# Patient Record
Sex: FEMALE | URBAN - METROPOLITAN AREA
[De-identification: names, ages, dates, MRNs, and addresses within clinical notes are randomized per-mention and may not be internally consistent; named-entity substitution may affect disease eponyms.]

---

## 2020-12-23 ENCOUNTER — TRANSFERRED RECORDS (OUTPATIENT)
Dept: HEALTH INFORMATION MANAGEMENT | Facility: CLINIC | Age: 25
End: 2020-12-23

## 2021-02-10 ENCOUNTER — TRANSFERRED RECORDS (OUTPATIENT)
Dept: HEALTH INFORMATION MANAGEMENT | Facility: CLINIC | Age: 26
End: 2021-02-10

## 2021-02-15 ENCOUNTER — MEDICAL CORRESPONDENCE (OUTPATIENT)
Dept: HEALTH INFORMATION MANAGEMENT | Facility: CLINIC | Age: 26
End: 2021-02-15

## 2021-04-13 ENCOUNTER — TELEPHONE (OUTPATIENT)
Dept: NEUROLOGY | Facility: CLINIC | Age: 26
End: 2021-04-13

## 2021-04-13 NOTE — TELEPHONE ENCOUNTER
Received referral from Virginia Gutiérrez NP and Joslyn Fu MD from [facility].    DX:  Frequent falls          Epilepsy          Tremor          Unsteady gait    Per hand written note on paperwork - Call Edwina @ [facility] [phone]

## 2021-05-24 ENCOUNTER — MEDICAL CORRESPONDENCE (OUTPATIENT)
Dept: HEALTH INFORMATION MANAGEMENT | Facility: CLINIC | Age: 26
End: 2021-05-24

## 2021-05-27 ENCOUNTER — OFFICE VISIT (OUTPATIENT)
Dept: NEUROLOGY | Facility: CLINIC | Age: 26
End: 2021-05-27
Payer: COMMERCIAL

## 2021-05-27 VITALS — WEIGHT: 137.2 LBS

## 2021-05-27 DIAGNOSIS — G40.909 RECURRENT SEIZURES (H): Primary | ICD-10-CM

## 2021-05-27 RX ORDER — ETHOSUXIMIDE 250 MG/5ML
550 SOLUTION ORAL 2 TIMES DAILY
COMMUNITY
End: 2022-06-09

## 2021-05-27 RX ORDER — DIVALPROEX SODIUM 125 MG/1
CAPSULE, COATED PELLETS ORAL
Qty: 180 CAPSULE | Refills: 11 | Status: SHIPPED | OUTPATIENT
Start: 2021-05-27 | End: 2022-06-09

## 2021-05-27 RX ORDER — DIVALPROEX SODIUM 125 MG/1
125 CAPSULE, COATED PELLETS ORAL 2 TIMES DAILY
COMMUNITY
Start: 2021-04-16 | End: 2021-05-27

## 2021-05-27 RX ORDER — POLYETHYLENE GLYCOL 3350 17 G/17G
1 POWDER, FOR SOLUTION ORAL DAILY
COMMUNITY

## 2021-05-27 RX ORDER — IBUPROFEN 100 MG/5ML
400 SUSPENSION, ORAL (FINAL DOSE FORM) ORAL PRN
COMMUNITY
Start: 2019-08-09

## 2021-05-27 SDOH — HEALTH STABILITY: MENTAL HEALTH: HOW OFTEN DO YOU HAVE A DRINK CONTAINING ALCOHOL?: NEVER

## 2021-05-27 NOTE — LETTER
2021       RE: Eli Linton  : 1995   MRN: [id number]      Dear Colleague,    Thank you for referring your patient, Eli Linton, to the [facility] EPILEPSY CARE at [facility]. Please see a copy of my visit note below.    Service Date: 2021    Joslyn Velázquez MD   [facility]   [id number], [address]   [place] [address]     RE:     Eli Linton  MRN:  [id number]  :  1995    Dear Dr. Trav Velázquez:    I had the pleasure of seeing your patient, Ms. Eli Linton, at [facility] today.  As you know, this patient is a 25-year-old female with history of epilepsy and Angelman syndrome.  She was referred because she had increased falls recently and had arm shaking or difficult to control.  The patient is accompanied by her  in the clinic today.    According to the medical record, she had history of seizures since she was about 1 years old.  She has been on ethosuximide and Depakote for many years, and her seizures were well controlled.  Her last known seizure was over 10 years ago.  It was reported that she had the focal impaired seizures and the generalized seizures when she was young.    It was reported that her last suspected seizure was in .  Her current seizure medications are:  Ethosuximide 550 mg b.i.d. and Depakote 375 mg in the morning and 500 mg in the evening.    For the past 6-8 months, she started to have increased falls recently and she also has bilateral arm shaking, sometimes can be fairly dramatic.  She can have arm shaking for like 8 hours a day.  She does not lose consciousness with these shaking, and sometimes she will fall.  It seems that she does not have bad injuries related to these falls.  When she has bad shaking, she may need assistance for walking.    Unfortunately, the patient cannot tolerate MRI or EEG so these were not done during the recent  years.    TRIGGERS FOR SEIZURES:  Unclear.    RISK FACTORS FOR SEIZURES:  She has a history of Angelman syndrome.  No history of head trauma with loss of consciousness.  No history of CNS infection.  She did have history of febrile convulsions when she was young.    Current Outpatient Medications   Medication Sig Dispense Refill     acetaminophen (TYLENOL) 32 mg/mL liquid Take 160 mg by mouth every 6 hours as needed for fever or mild pain       divalproex sodium delayed-release (DEPAKOTE SPRINKLE) 125 MG DR capsule Takes 375 in am and 375 mg at night 180 capsule 11     ethosuximide (ZARONTIN) 250 MG/5ML solution Take 550 mg by mouth 2 times daily       ibuprofen (ADVIL/MOTRIN) 100 MG/5ML suspension Take 400 mg by mouth as needed       magnesium hydroxide (MILK OF MAGNESIA) 400 MG/5ML suspension Take 5 mLs by mouth as needed       polyethylene glycol (MIRALAX) 17 g packet Take 1 packet by mouth daily         ALLERGIES:  Amoxicillin.    PAST MEDICAL HISTORY:  Epilepsy, tremor, unsteady gait, Angelman syndrome.    PAST SURGICAL HISTORY:  None.    FAMILY HISTORY:  No family history of seizures.    SOCIAL HISTORY:  She lives in a group home.  She had some Special Education.  She is not working.  Never , no children.  No smoking, no alcohol, no drug abuse.    REVIEW OF SYSTEMS:  Positive for constipation and urinary incontinence.    PHYSICAL EXAMINATION:      Weight 137 lb 3.2 oz (62.2 kg).    General exam: General Appearance: No acute distress. HEENT: Normocephalic, atraumatic. Neck: Supple.  Extremities: No edema, no clubbing, no cyanosis.     NEUROLOGIC:  Alert, nonverbal, partially following commands.    Cranial nerves grossly normal.  Partially cooperating with exam.    Motor exam:  Moving all extremities spontaneously.    Sensory exam:  Not able to assess.    Coordination:  No obvious ataxia observed.  Gait:  Normal gait.    PREVIOUS DIAGNOSTIC TESTING:  No relevant testing was performed  recently.    IMPRESSION:     1.  History of epilepsy, well controlled.  Last seizure was in .  2.  History of Angelman syndrome, stable.  3.  Recent spells of arm shaking and the related falls.  The etiology for these spells are not entirely clear at this time; however, it is fairly clear that these are not epilepsy or seizures.  Differential diagnosis would include movement disorder or simply behavioral.  I would suggest have consultation with a movement specialist and have psychiatry evaluation.  Another possibility for these arm shaking is asterixis related to increased ammonia; however, her recent ammonia were all relatively normal, so this may not be relevant in this case.    PLAN:    1.  The patient is not able to tolerate MRI or EEG; however, seizures are well controlled.  2.  Occupational and physical therapy might help.  3.  We will decrease Depakote to 375 mg twice daily.  4.  It may be helpful to refer to a movement specialist and a psychiatry evaluation if needed.  5.  Return to clinic as needed.        Sincerely,        MD Jairo Mcdermott MD      69 min total time was spent on the day of this visit.      45 min was spent on face to face time  8 min was spent on preparation of visit to review charts and labs, ordering medications and tests  16 min was spent on documentation of clinical information          D: 2021   T: 2021   MT: augusto    Name:     LYUDMILA DUTTA  MRN:      [id number]        Account:      [id number]   :      1995           Service Date: 2021       Document: [id number]      Again, thank you for allowing me to participate in the care of your patient.      Sincerely,    Jairo Lofton MD

## 2021-05-28 NOTE — PROGRESS NOTES
Service Date: 2021    Joslyn Velázquez MD   [facility]   [address], [address]   [place] [address]     RE:     Eli Linton  MRN:  [id number]  :  1995    Dear Dr. Trav Velázquez:    I had the pleasure of seeing your patient, Ms. Eli Linton, at [facility] today.  As you know, this patient is a 25-year-old female with history of epilepsy and Angelman syndrome.  She was referred because she had increased falls recently and had arm shaking or difficult to control.  The patient is accompanied by her  in the clinic today.    According to the medical record, she had history of seizures since she was about 1 years old.  She has been on ethosuximide and Depakote for many years, and her seizures were well controlled.  Her last known seizure was over 10 years ago.  It was reported that she had the focal impaired seizures and the generalized seizures when she was young.    It was reported that her last suspected seizure was in .  Her current seizure medications are:  Ethosuximide 550 mg b.i.d. and Depakote 375 mg in the morning and 500 mg in the evening.    For the past 6-8 months, she started to have increased falls recently and she also has bilateral arm shaking, sometimes can be fairly dramatic.  She can have arm shaking for like 8 hours a day.  She does not lose consciousness with these shaking, and sometimes she will fall.  It seems that she does not have bad injuries related to these falls.  When she has bad shaking, she may need assistance for walking.    Unfortunately, the patient cannot tolerate MRI or EEG so these were not done during the recent years.    TRIGGERS FOR SEIZURES:  Unclear.    RISK FACTORS FOR SEIZURES:  She has a history of Angelman syndrome.  No history of head trauma with loss of consciousness.  No history of CNS infection.  She did have history of febrile convulsions when she was young.    Current Outpatient  Medications   Medication Sig Dispense Refill     acetaminophen (TYLENOL) 32 mg/mL liquid Take 160 mg by mouth every 6 hours as needed for fever or mild pain       divalproex sodium delayed-release (DEPAKOTE SPRINKLE) 125 MG DR capsule Takes 375 in am and 375 mg at night 180 capsule 11     ethosuximide (ZARONTIN) 250 MG/5ML solution Take 550 mg by mouth 2 times daily       ibuprofen (ADVIL/MOTRIN) 100 MG/5ML suspension Take 400 mg by mouth as needed       magnesium hydroxide (MILK OF MAGNESIA) 400 MG/5ML suspension Take 5 mLs by mouth as needed       polyethylene glycol (MIRALAX) 17 g packet Take 1 packet by mouth daily         ALLERGIES:  Amoxicillin.    PAST MEDICAL HISTORY:  Epilepsy, tremor, unsteady gait, Angelman syndrome.    PAST SURGICAL HISTORY:  None.    FAMILY HISTORY:  No family history of seizures.    SOCIAL HISTORY:  She lives in a group home.  She had some Special Education.  She is not working.  Never , no children.  No smoking, no alcohol, no drug abuse.    REVIEW OF SYSTEMS:  Positive for constipation and urinary incontinence.    PHYSICAL EXAMINATION:      Weight 137 lb 3.2 oz (62.2 kg).    General exam: General Appearance: No acute distress. HEENT: Normocephalic, atraumatic. Neck: Supple.  Extremities: No edema, no clubbing, no cyanosis.     NEUROLOGIC:  Alert, nonverbal, partially following commands.    Cranial nerves grossly normal.  Partially cooperating with exam.    Motor exam:  Moving all extremities spontaneously.    Sensory exam:  Not able to assess.    Coordination:  No obvious ataxia observed.  Gait:  Normal gait.    PREVIOUS DIAGNOSTIC TESTING:  No relevant testing was performed recently.    IMPRESSION:     1.  History of epilepsy, well controlled.  Last seizure was in 2009.  2.  History of Angelman syndrome, stable.  3.  Recent spells of arm shaking and the related falls.  The etiology for these spells are not entirely clear at this time; however, it is fairly clear that these  are not epilepsy or seizures.  Differential diagnosis would include movement disorder or simply behavioral.  I would suggest have consultation with a movement specialist and have psychiatry evaluation.  Another possibility for these arm shaking is asterixis related to increased ammonia; however, her recent ammonia were all relatively normal, so this may not be relevant in this case.    PLAN:    1.  The patient is not able to tolerate MRI or EEG; however, seizures are well controlled.  2.  Occupational and physical therapy might help.  3.  We will decrease Depakote to 375 mg twice daily.  4.  It may be helpful to refer to a movement specialist and a psychiatry evaluation if needed.  5.  Return to clinic as needed.        Sincerely,        MD Jairo Mcdermott MD      69 min total time was spent on the day of this visit.      45 min was spent on face to face time  8 min was spent on preparation of visit to review charts and labs, ordering medications and tests  16 min was spent on documentation of clinical information          D: 2021   T: 2021   MT: augusto    Name:     LYUDMILA DUTTA  MRN:      [id number]        Account:      [id number]   :      1995           Service Date: 2021       Document: [id number]

## 2022-06-09 ENCOUNTER — VIRTUAL VISIT (OUTPATIENT)
Dept: NEUROLOGY | Facility: CLINIC | Age: 27
End: 2022-06-09
Payer: COMMERCIAL

## 2022-06-09 ENCOUNTER — TELEPHONE (OUTPATIENT)
Dept: NEUROLOGY | Facility: CLINIC | Age: 27
End: 2022-06-09

## 2022-06-09 VITALS — HEIGHT: 63 IN | BODY MASS INDEX: 24.45 KG/M2 | WEIGHT: 138 LBS

## 2022-06-09 DIAGNOSIS — G40.909 RECURRENT SEIZURES (H): ICD-10-CM

## 2022-06-09 RX ORDER — DIVALPROEX SODIUM 125 MG/1
CAPSULE, COATED PELLETS ORAL
Qty: 180 CAPSULE | Refills: 11 | Status: SHIPPED | OUTPATIENT
Start: 2022-06-09 | End: 2023-04-24

## 2022-06-09 RX ORDER — ETHOSUXIMIDE 250 MG/5ML
550 SOLUTION ORAL 2 TIMES DAILY
Qty: 700 ML | Refills: 11 | Status: SHIPPED | OUTPATIENT
Start: 2022-06-09 | End: 2023-05-04

## 2022-06-09 NOTE — PATIENT INSTRUCTIONS
Times of Days           Medication Tablet Size Number of Tablets/Capsules Total Daily Dosage    Depakote 125 3 3       750 mg    Zorontin 550 1 1       1100 mg                                                                                                                 Carry this with you at all times.  CONTINUE TAKING YOUR OTHER MEDICATIONS AS PREVIOUSLY DIRECTED.      * * *Do not store medications in the bathroom.  Keep medications away from children!* * *

## 2022-06-09 NOTE — LETTER
2022     RE: Eli Linton  : 1995   MRN: [id number]      Dear Colleague,    Thank you for referring your patient, Eli Linton, to the [facility] EPILEPSY CARE at [facility]. Please see a copy of my visit note below.    Eli is a 27 year old who is being evaluated via a billable video visit.      How would you like to obtain your AVS? Mail a copy  If the video visit is dropped, the invitation should be resent by: Other e-mail: [email or web address]  Will anyone else be joining your video visit? Yes,  Edwina will be joining.       Video Start Time: 11:30 AM  Video-Visit Details    Type of service:  Video Visit    Video End Time:11:50 AM    Originating Location (pt. Location): Home    Distant Location (provider location):  [facility] EPILEPSY CARE     Platform used for Video Visit: Nov[employer]       Medication and allergies have been reviewed.       ANABELLA Asencio        CC: Follow up for seizures.    HPI: Video call for follow up.  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  She was referred last year because she had increased falls recently and had arm shaking or difficult to control.  Her  Edwina was also in the video call today.    Since the last visit about one year ago, she continue to have arms shaking. about once every 2 days.  Each shaking will last for 2 min.  Sometimes shaking will affect her eating, she will not be able to hold a spoon. Her Depakote was decreased to 375 mg bid.    According to the medical record, she had history of seizures since she was about 1 years old.  She has been on ethosuximide and Depakote for many years, and her seizures were well controlled.  Her last known seizure was over 10 years ago.  It was reported that she had the focal impaired seizures and the generalized seizures when she was young.    It was reported that her last suspected seizure  was in 2009.  Her current seizure medications are:  Ethosuximide 550 mg b.i.d. and Depakote 375 mg in the morning and 500 mg in the evening.    For the past 6-8 months, she started to have increased falls recently and she also has bilateral arm shaking, sometimes can be fairly dramatic.  She can have arm shaking for like 8 hours a day.  She does not lose consciousness with these shaking, and sometimes she will fall.  It seems that she does not have bad injuries related to these falls.  When she has bad shaking, she may need assistance for walking.    Unfortunately, the patient cannot tolerate MRI or EEG so these were not done during the recent years.    TRIGGERS FOR SEIZURES:  Unclear.    RISK FACTORS FOR SEIZURES:  She has a history of Angelman syndrome.  No history of head trauma with loss of consciousness.  No history of CNS infection.  She did have history of febrile convulsions when she was young.    Current Outpatient Medications   Medication Sig Dispense Refill     acetaminophen (TYLENOL) 32 mg/mL liquid Take 160 mg by mouth every 6 hours as needed for fever or mild pain       divalproex sodium delayed-release (DEPAKOTE SPRINKLE) 125 MG DR capsule Takes 375 in am and 375 mg at night 180 capsule 11     ethosuximide (ZARONTIN) 250 MG/5ML solution Take 550 mg by mouth 2 times daily       ibuprofen (ADVIL/MOTRIN) 100 MG/5ML suspension Take 400 mg by mouth as needed       magnesium hydroxide (MILK OF MAGNESIA) 400 MG/5ML suspension Take 5 mLs by mouth as needed       polyethylene glycol (MIRALAX) 17 g packet Take 1 packet by mouth daily         ALLERGIES:  Amoxicillin.    PAST MEDICAL HISTORY:  Epilepsy, tremor, unsteady gait, Angelman syndrome.    PAST SURGICAL HISTORY:  None.    FAMILY HISTORY:  No family history of seizures.    SOCIAL HISTORY:  She lives in a group home.  She had some Special Education.  She is not working.  Never , no children.  No smoking, no alcohol, no drug abuse.    REVIEW OF  SYSTEMS:  Positive for constipation and urinary incontinence.    PHYSICAL EXAMINATION:      Alert, non-verbal.  Not following commands.    PREVIOUS DIAGNOSTIC TESTING:  No relevant testing was performed recently.    IMPRESSION:     1.  History of epilepsy, well controlled.  Last seizure was in 2009.  2.  History of Angelman syndrome, stable.  3.  Recent spells of arm shaking and the related falls.  The etiology for these spells are not entirely clear at this time; however, it is fairly clear that these are not epilepsy or seizures.  Differential diagnosis would include movement disorder or simply behavioral.  I would suggest have consultation with a movement specialist and have psychiatry evaluation.  Another possibility for these arm shaking is asterixis related to increased ammonia; however, her recent ammonia were all relatively normal.    Since the last visit about one year ago, she continue to have arms shaking. about once every 2 days.  Each shaking will last for 2 min.  Sometimes shaking will affect her eating, she will not be able to hold a spoon. Her Depakote was decreased to 375 mg bid.    PLAN:    1.  The patient is not able to tolerate MRI or EEG; however, seizures are well controlled.  2.  Continue Depakote to 375 mg twice daily.  3.  It may be helpful to refer to a movement specialist and a psychiatry evaluation if needed.  4.  Return to clinic as needed.  Group home staff was advised to take some videos of her shaking to help the diagnosis.      32 min total time was spent on the day of this visit.      20 min was spent on face to face time  6 min was spent on preparation of visit to review charts and labs, ordering medications and tests  6 min was spent on documentation of clinical information      Again, thank you for allowing me to participate in the care of your patient.      Sincerely,    Jairo Lofton MD

## 2022-06-09 NOTE — TELEPHONE ENCOUNTER
Received the medical referral form to be completed.  Form saved to the MovieSet drive.  Encounter routed.  Hailey Barron CMA

## 2022-06-09 NOTE — NURSING NOTE
Edwina states pt is also taking Cranberry Capsules 500MG once daily in the morning, capsules have to be opened.

## 2022-06-09 NOTE — PROGRESS NOTES
CC: Follow up for seizures.    HPI: Video call for follow up.  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  She was referred last year because she had increased falls recently and had arm shaking or difficult to control.  Her  Edwina was also in the video call today.    Since the last visit about one year ago, she continue to have arms shaking. about once every 2 days.  Each shaking will last for 2 min.  Sometimes shaking will affect her eating, she will not be able to hold a spoon. Her Depakote was decreased to 375 mg bid.    According to the medical record, she had history of seizures since she was about 1 years old.  She has been on ethosuximide and Depakote for many years, and her seizures were well controlled.  Her last known seizure was over 10 years ago.  It was reported that she had the focal impaired seizures and the generalized seizures when she was young.    It was reported that her last suspected seizure was in 2009.  Her current seizure medications are:  Ethosuximide 550 mg b.i.d. and Depakote 375 mg in the morning and 500 mg in the evening.    For the past 6-8 months, she started to have increased falls recently and she also has bilateral arm shaking, sometimes can be fairly dramatic.  She can have arm shaking for like 8 hours a day.  She does not lose consciousness with these shaking, and sometimes she will fall.  It seems that she does not have bad injuries related to these falls.  When she has bad shaking, she may need assistance for walking.    Unfortunately, the patient cannot tolerate MRI or EEG so these were not done during the recent years.    TRIGGERS FOR SEIZURES:  Unclear.    RISK FACTORS FOR SEIZURES:  She has a history of Angelman syndrome.  No history of head trauma with loss of consciousness.  No history of CNS infection.  She did have history of febrile convulsions when she was young.    Current Outpatient Medications   Medication  Sig Dispense Refill     acetaminophen (TYLENOL) 32 mg/mL liquid Take 160 mg by mouth every 6 hours as needed for fever or mild pain       divalproex sodium delayed-release (DEPAKOTE SPRINKLE) 125 MG DR capsule Takes 375 in am and 375 mg at night 180 capsule 11     ethosuximide (ZARONTIN) 250 MG/5ML solution Take 550 mg by mouth 2 times daily       ibuprofen (ADVIL/MOTRIN) 100 MG/5ML suspension Take 400 mg by mouth as needed       magnesium hydroxide (MILK OF MAGNESIA) 400 MG/5ML suspension Take 5 mLs by mouth as needed       polyethylene glycol (MIRALAX) 17 g packet Take 1 packet by mouth daily         ALLERGIES:  Amoxicillin.    PAST MEDICAL HISTORY:  Epilepsy, tremor, unsteady gait, Angelman syndrome.    PAST SURGICAL HISTORY:  None.    FAMILY HISTORY:  No family history of seizures.    SOCIAL HISTORY:  She lives in a group home.  She had some Special Education.  She is not working.  Never , no children.  No smoking, no alcohol, no drug abuse.    REVIEW OF SYSTEMS:  Positive for constipation and urinary incontinence.    PHYSICAL EXAMINATION:      Alert, non-verbal.  Not following commands.    PREVIOUS DIAGNOSTIC TESTING:  No relevant testing was performed recently.    IMPRESSION:     1.  History of epilepsy, well controlled.  Last seizure was in 2009.  2.  History of Angelman syndrome, stable.  3.  Recent spells of arm shaking and the related falls.  The etiology for these spells are not entirely clear at this time; however, it is fairly clear that these are not epilepsy or seizures.  Differential diagnosis would include movement disorder or simply behavioral.  I would suggest have consultation with a movement specialist and have psychiatry evaluation.  Another possibility for these arm shaking is asterixis related to increased ammonia; however, her recent ammonia were all relatively normal.    Since the last visit about one year ago, she continue to have arms shaking. about once every 2 days.  Each shaking  will last for 2 min.  Sometimes shaking will affect her eating, she will not be able to hold a spoon. Her Depakote was decreased to 375 mg bid.    PLAN:    1.  The patient is not able to tolerate MRI or EEG; however, seizures are well controlled.  2.  Continue Depakote to 375 mg twice daily.  3.  It may be helpful to refer to a movement specialist and a psychiatry evaluation if needed.  4.  Return to clinic as needed.  Group home staff was advised to take some videos of her shaking to help the diagnosis.      32 min total time was spent on the day of this visit.      20 min was spent on face to face time  6 min was spent on preparation of visit to review charts and labs, ordering medications and tests  6 min was spent on documentation of clinical information

## 2022-06-09 NOTE — PROGRESS NOTES
Eli is a 27 year old who is being evaluated via a billable video visit.      How would you like to obtain your AVS? Mail a copy  If the video visit is dropped, the invitation should be resent by: Other e-mail: [email or web address]  Will anyone else be joining your video visit? Yes,  Edwina will be joining.       Video Start Time: 11:30 AM  Video-Visit Details    Type of service:  Video Visit    Video End Time:11:50 AM    Originating Location (pt. Location): Home    Distant Location (provider location):  [facility] EPILEPSY CARE     Platform used for Video Visit: [employer]       Medication and allergies have been reviewed.       Jude Simpson, VF

## 2022-06-20 ENCOUNTER — MEDICAL CORRESPONDENCE (OUTPATIENT)
Dept: HEALTH INFORMATION MANAGEMENT | Facility: CLINIC | Age: 27
End: 2022-06-20

## 2022-06-22 ENCOUNTER — DOCUMENTATION ONLY (OUTPATIENT)
Dept: OTHER | Facility: CLINIC | Age: 27
End: 2022-06-22

## 2022-12-20 ENCOUNTER — TELEPHONE (OUTPATIENT)
Dept: NEUROLOGY | Facility: CLINIC | Age: 27
End: 2022-12-20

## 2022-12-20 NOTE — TELEPHONE ENCOUNTER
What is the concern that needs to be addressed by a nurse? Patient missed dose of Depakote 12/18 & 12/19, she has been very sleepy since.    May a detailed message be left on voicemail? Yes    Date of last office visit: 6/9/22    Message routed to: [facility] RN Pool

## 2022-12-21 NOTE — TELEPHONE ENCOUNTER
Call placed to Edwina.  Patient missed a dose on the 18th, but received her doses as prescribed on the 19th.  Facility staff have been able to identify what happened and have taken steps to reduce the risk of it happening again (facility staff will have two staff check administrations).    No seizures reported by the weekend staff, patient is currently at her baseline.    Edwina wanted  to know that they have not yet been able to arrange a psychiatry provider, but that it is currently being worked on.    No other questions or concerns at this time

## 2022-12-30 ENCOUNTER — TELEPHONE (OUTPATIENT)
Dept: NEUROLOGY | Facility: CLINIC | Age: 27
End: 2022-12-30

## 2022-12-31 NOTE — TELEPHONE ENCOUNTER
Spoke to group home staff.  Found a capsule that patient likely didn't take today (depakote).  There may be issues with her successfully getting the medication.    Staff has concerns about this.    If concerns persist may want to get a depakote level which we discussed.  They will try to remedy the situation and continue to monitor the situation.

## 2023-04-24 ENCOUNTER — TELEPHONE (OUTPATIENT)
Dept: NEUROLOGY | Facility: CLINIC | Age: 28
End: 2023-04-24

## 2023-04-24 DIAGNOSIS — G40.909 RECURRENT SEIZURES (H): ICD-10-CM

## 2023-04-24 RX ORDER — DIVALPROEX SODIUM 125 MG/1
CAPSULE, COATED PELLETS ORAL
Qty: 180 CAPSULE | Refills: 2 | Status: SHIPPED | OUTPATIENT
Start: 2023-04-24 | End: 2023-05-04

## 2023-04-24 NOTE — TELEPHONE ENCOUNTER
Returned call to Edwina, No answer so lvm.  Patient last seen 6/2022 and was to continue divalproex 375 BID with f/u PRN. Instructed they need to f/u yearly if they would like Dr. Lofton to continue provided refills for divalproex.    Divalproex refilled and encouraged to schedule f/u with Dr. Lofton in next few months.       Abigail Martin PA-C

## 2023-04-24 NOTE — TELEPHONE ENCOUNTER
What is the concern that needs to be addressed by a nurse? Edwina Patient PCA  Worker call in stating patient is out of depakote medication. Edwina is also asking if  would like to see patient.     May a detailed message be left on voicemail? Yes         Message routed to: [facility] RN Pool

## 2023-05-04 ENCOUNTER — VIRTUAL VISIT (OUTPATIENT)
Dept: NEUROLOGY | Facility: CLINIC | Age: 28
End: 2023-05-04
Payer: COMMERCIAL

## 2023-05-04 DIAGNOSIS — G40.909 RECURRENT SEIZURES (H): ICD-10-CM

## 2023-05-04 RX ORDER — ETHOSUXIMIDE 250 MG/5ML
550 SOLUTION ORAL 2 TIMES DAILY
Qty: 700 ML | Refills: 11 | Status: SHIPPED | OUTPATIENT
Start: 2023-05-04 | End: 2024-05-30

## 2023-05-04 RX ORDER — DIVALPROEX SODIUM 125 MG/1
CAPSULE, COATED PELLETS ORAL
Qty: 180 CAPSULE | Refills: 11 | Status: SHIPPED | OUTPATIENT
Start: 2023-05-04 | End: 2024-05-16

## 2023-05-04 NOTE — NURSING NOTE
Unable to do PHQ-9 with patient      Is the patient currently in the state of [place]? YES    Visit mode:VIDEO    If the visit is dropped, the patient can be reconnected by: VIDEO VISIT: Send to e-mail at: [email or web address]  Will anyone else be joining the visit? YES: How would they like to receive their invitation? Text to cell phone: Edwina- Group Home Staff      How would you like to obtain your AVS? MyChart    Are changes needed to the allergy or medication list? NO    Reason for visit: Video Visit (Edwina said, medication refills, wondering if PCP needs to monitor the medication like before. )      Mel Doss, VF

## 2023-05-04 NOTE — LETTER
2023       RE: Eli Linton  : 1995   MRN: [id number]        Dear Colleague,    Thank you for referring your patient, Eli Linton, to the [facility] EPILEPSY CARE at [facility]. Please see a copy of my visit note below.    CC: Follow up for seizures.    HPI: Video call for follow up.  This patient is a 27-year-old female with history of epilepsy and Angelman syndrome.  She was referred last year because she had increased falls recently and had arm shaking or difficult to control.  Her  Edwina was also in the video call today.    Since the last visit about one year ago, she had no seizures. She continue to have arms shaking, about 3 times a week.  Each shaking will last for 1-2 min.  Sometimes shaking will affect her eating, she will not be able to hold a spoon. Her Depakote was decreased to 375 mg bid.  Her last seizure was over 10 years ago. She is taking Depakote 375 mg bid and Zarontin 550 mg bid.    According to the medical record, she had history of seizures since she was about 1 years old.  She has been on ethosuximide and Depakote for many years, and her seizures were well controlled.  Her last known seizure was over 10 years ago.  It was reported that she had the focal impaired seizures and the generalized seizures when she was young.    It was reported that her last suspected seizure was in .  Her current seizure medications are:  Ethosuximide 550 mg b.i.d. and Depakote 375 mg in the morning and 500 mg in the evening.    For the past 6-8 months, she started to have increased falls recently and she also has bilateral arm shaking, sometimes can be fairly dramatic.  She can have arm shaking for like 8 hours a day.  She does not lose consciousness with these shaking, and sometimes she will fall.  It seems that she does not have bad injuries related to these falls.  When she has bad shaking, she may need assistance for  walking.    Unfortunately, the patient cannot tolerate MRI or EEG so these were not done during the recent years.    TRIGGERS FOR SEIZURES:  Unclear.    RISK FACTORS FOR SEIZURES:  She has a history of Angelman syndrome.  No history of head trauma with loss of consciousness.  No history of CNS infection.  She did have history of febrile convulsions when she was young.    Current Outpatient Medications   Medication Sig Dispense Refill    acetaminophen (TYLENOL) 32 mg/mL liquid Take 160 mg by mouth every 6 hours as needed for fever or mild pain      divalproex sodium delayed-release (DEPAKOTE SPRINKLE) 125 MG DR capsule Take 3 tabs (375 mg) in am and 3 tabs (375 mg) at night 180 capsule 2    ethosuximide (ZARONTIN) 250 MG/5ML solution Take 11 mLs (550 mg) by mouth 2 times daily 700 mL 11    ibuprofen (ADVIL/MOTRIN) 100 MG/5ML suspension Take 400 mg by mouth as needed      magnesium hydroxide (MILK OF MAGNESIA) 400 MG/5ML suspension Take 5 mLs by mouth as needed      polyethylene glycol (MIRALAX) 17 g packet Take 1 packet by mouth daily         ALLERGIES:  Amoxicillin.    PAST MEDICAL HISTORY:  Epilepsy, tremor, unsteady gait, Angelman syndrome.    PAST SURGICAL HISTORY:  None.    FAMILY HISTORY:  No family history of seizures.    SOCIAL HISTORY:  She lives in a group home.  She had some Special Education.  She is not working.  Never , no children.  No smoking, no alcohol, no drug abuse.    REVIEW OF SYSTEMS:  Positive for constipation and urinary incontinence.    PHYSICAL EXAMINATION:      Alert, non-verbal.  Not following commands.    PREVIOUS DIAGNOSTIC TESTING:  No relevant testing was performed recently.    IMPRESSION:     1.  History of epilepsy, well controlled.  Last seizure was in 2009.  2.  History of Angelman syndrome, stable.  3.  Recent spells of arm shaking and the related falls.  The etiology for these spells are not entirely clear at this time; however, it is fairly clear that these are not  epilepsy or seizures.  Differential diagnosis would include movement disorder or simply behavioral.  I would suggest have consultation with a movement specialist and have psychiatry evaluation.  Another possibility for these arm shaking is asterixis related to increased ammonia; however, her recent ammonia were all relatively normal.    Since the last visit about one year ago, she had no seizures. She continue to have arms shaking, about 3 times a week.  Each shaking will last for 1-2 min.  Sometimes shaking will affect her eating, she will not be able to hold a spoon. Her Depakote was decreased to 375 mg bid.  Her last seizure was over 10 years ago.    She is taking Depakote 375 mg bid and Zarontin 550 mg bid.    PLAN:    1.  The patient is not able to tolerate MRI or EEG; seizures are well controlled.  2.  Continue Depakote to 375 mg twice daily, Zarontin 550 mg bid.  3.  Return to clinic in 12 month to follow up with Raven. Group home staff was advised to take some videos of her shaking to help the diagnosis.      28 min total time was spent on the day of this visit.      18 min was spent on face to face time  10 min was spent on preparation of visit to review charts and labs, ordering medications and tests, and documentation of clinical information          Again, thank you for allowing me to participate in the care of your patient.      Sincerely,    Jairo Lofton MD

## 2023-05-04 NOTE — PROGRESS NOTES
Virtual Visit Details      Originating Location (pt. Location): Home    Distant Location (provider location):  On-site  Platform used for Video Visit: Jimmy

## 2023-05-04 NOTE — PROGRESS NOTES
CC: Follow up for seizures.    HPI: Video call for follow up.  This patient is a 27-year-old female with history of epilepsy and Angelman syndrome.  She was referred last year because she had increased falls recently and had arm shaking or difficult to control.  Her  Edwina was also in the video call today.    Since the last visit about one year ago, she had no seizures. She continue to have arms shaking, about 3 times a week.  Each shaking will last for 1-2 min.  Sometimes shaking will affect her eating, she will not be able to hold a spoon. Her Depakote was decreased to 375 mg bid.  Her last seizure was over 10 years ago. She is taking Depakote 375 mg bid and Zarontin 550 mg bid.    According to the medical record, she had history of seizures since she was about 1 years old.  She has been on ethosuximide and Depakote for many years, and her seizures were well controlled.  Her last known seizure was over 10 years ago.  It was reported that she had the focal impaired seizures and the generalized seizures when she was young.    It was reported that her last suspected seizure was in 2009.  Her current seizure medications are:  Ethosuximide 550 mg b.i.d. and Depakote 375 mg in the morning and 500 mg in the evening.    For the past 6-8 months, she started to have increased falls recently and she also has bilateral arm shaking, sometimes can be fairly dramatic.  She can have arm shaking for like 8 hours a day.  She does not lose consciousness with these shaking, and sometimes she will fall.  It seems that she does not have bad injuries related to these falls.  When she has bad shaking, she may need assistance for walking.    Unfortunately, the patient cannot tolerate MRI or EEG so these were not done during the recent years.    TRIGGERS FOR SEIZURES:  Unclear.    RISK FACTORS FOR SEIZURES:  She has a history of Angelman syndrome.  No history of head trauma with loss of consciousness.  No history of CNS  infection.  She did have history of febrile convulsions when she was young.    Current Outpatient Medications   Medication Sig Dispense Refill     acetaminophen (TYLENOL) 32 mg/mL liquid Take 160 mg by mouth every 6 hours as needed for fever or mild pain       divalproex sodium delayed-release (DEPAKOTE SPRINKLE) 125 MG DR capsule Take 3 tabs (375 mg) in am and 3 tabs (375 mg) at night 180 capsule 2     ethosuximide (ZARONTIN) 250 MG/5ML solution Take 11 mLs (550 mg) by mouth 2 times daily 700 mL 11     ibuprofen (ADVIL/MOTRIN) 100 MG/5ML suspension Take 400 mg by mouth as needed       magnesium hydroxide (MILK OF MAGNESIA) 400 MG/5ML suspension Take 5 mLs by mouth as needed       polyethylene glycol (MIRALAX) 17 g packet Take 1 packet by mouth daily         ALLERGIES:  Amoxicillin.    PAST MEDICAL HISTORY:  Epilepsy, tremor, unsteady gait, Angelman syndrome.    PAST SURGICAL HISTORY:  None.    FAMILY HISTORY:  No family history of seizures.    SOCIAL HISTORY:  She lives in a group home.  She had some Special Education.  She is not working.  Never , no children.  No smoking, no alcohol, no drug abuse.    REVIEW OF SYSTEMS:  Positive for constipation and urinary incontinence.    PHYSICAL EXAMINATION:      Alert, non-verbal.  Not following commands.    PREVIOUS DIAGNOSTIC TESTING:  No relevant testing was performed recently.    IMPRESSION:     1.  History of epilepsy, well controlled.  Last seizure was in 2009.  2.  History of Angelman syndrome, stable.  3.  Recent spells of arm shaking and the related falls.  The etiology for these spells are not entirely clear at this time; however, it is fairly clear that these are not epilepsy or seizures.  Differential diagnosis would include movement disorder or simply behavioral.  I would suggest have consultation with a movement specialist and have psychiatry evaluation.  Another possibility for these arm shaking is asterixis related to increased ammonia; however, her  recent ammonia were all relatively normal.    Since the last visit about one year ago, she had no seizures. She continue to have arms shaking, about 3 times a week.  Each shaking will last for 1-2 min.  Sometimes shaking will affect her eating, she will not be able to hold a spoon. Her Depakote was decreased to 375 mg bid.  Her last seizure was over 10 years ago.    She is taking Depakote 375 mg bid and Zarontin 550 mg bid.    PLAN:    1.  The patient is not able to tolerate MRI or EEG; seizures are well controlled.  2.  Continue Depakote to 375 mg twice daily, Zarontin 550 mg bid.  3.  Return to clinic in 12 month to follow up with Raven. Group home staff was advised to take some videos of her shaking to help the diagnosis.      28 min total time was spent on the day of this visit.      18 min was spent on face to face time  10 min was spent on preparation of visit to review charts and labs, ordering medications and tests, and documentation of clinical information

## 2024-02-22 ENCOUNTER — OFFICE VISIT (OUTPATIENT)
Dept: NEUROLOGY | Facility: CLINIC | Age: 29
End: 2024-02-22
Payer: COMMERCIAL

## 2024-02-22 VITALS — TEMPERATURE: 97.2 F

## 2024-02-22 DIAGNOSIS — G40.909 RECURRENT SEIZURES (H): Primary | ICD-10-CM

## 2024-02-22 NOTE — LETTER
2024       RE: Eli Linton  : 1995   MRN: [id number]      Dear Colleague,    Thank you for referring your patient, Eli Linton, to the [facility] EPILEPSY CARE at [facility]. Please see a copy of my visit note below.    [facility] Epilepsy Care Progress Note      Patient:  Eli Linton  :  1995   Age:  28 year old   Today's Office Visit:  2024    Epilepsy Data:  According to the medical record, she had history of seizures since she was about 1 years old.  She has been on ethosuximide and Depakote for many years, and her seizures were well controlled.  Her last known seizure was over 10 years ago.  It was reported that she had the focal impaired seizures and the generalized seizures when she was young.     It was reported that her last suspected seizure was in .  Her current seizure medications are:  Ethosuximide 550 mg b.i.d. and Depakote 375 mg in the morning and 500 mg in the evening.     For the past 6-8 months, she started to have increased falls recently and she also has bilateral arm shaking, sometimes can be fairly dramatic.  She can have arm shaking for like 8 hours a day.  She does not lose consciousness with these shaking, and sometimes she will fall.  It seems that she does not have bad injuries related to these falls.  When she has bad shaking, she may need assistance for walking.     Unfortunately, the patient cannot tolerate MRI or EEG so these were not done during the recent years.        History of Present Illness:   Ms. Linton was last seen by Dr. Rolly mejía on 2023. At the time of the patient's last visit, she had history of epilepsy, well controlled, with her last seizure in . She had a history of Angelman syndrome which was stable. She had recent spells of arm shaking and related falls, etiology not clear, though not felt to be seizures. Movement consultation versus psychiatry  was suggested. Her depakote was decreased with this, ammonia was normal with asterixis a possibility but less likely. She could not tolerate an MRI or EEG, and was well controlled. She was to continue Depakote 375mg twice daily and Zarontin 550mg twice daily. She was to follow up in 1 year.     Today, Ms. Linton presents with two group home staff. They note she is having increasing falls. They are concerned it may be related to her seizure medications. She can slip and fall. The tremor, drooling and falls are daily.     With her shaking, she sometimes needs assistance with feeding.     She is having more problems with behavioral issues recently. There is question for possible UTI.     She has not had any seizure since 2009.      She was having issues with drooling in the past when her depakote was high.     She has had increased unsteadiness, with delays in initiation of movement. She is having more trouble with her grab bars with her shaking. They are using a gait belt with her unsteadiness.    She is sleeping well, getting greater than 5 hours. They state she is unpredictable. She is aware of shift changes which impacts her sleep.    Current Outpatient Medications   Medication Sig Dispense Refill    acetaminophen (TYLENOL) 32 mg/mL liquid Take 160 mg by mouth every 6 hours as needed for fever or mild pain      divalproex sodium delayed-release (DEPAKOTE SPRINKLE) 125 MG DR capsule Take 3 tabs (375 mg) in am and 3 tabs (375 mg) at night 180 capsule 11    ethosuximide (ZARONTIN) 250 MG/5ML solution Take 11 mLs (550 mg) by mouth 2 times daily 700 mL 11    ibuprofen (ADVIL/MOTRIN) 100 MG/5ML suspension Take 400 mg by mouth as needed      magnesium hydroxide (MILK OF MAGNESIA) 400 MG/5ML suspension Take 5 mLs by mouth as needed      polyethylene glycol (MIRALAX) 17 g packet Take 1 packet by mouth daily          Perceived AED Side Effects:  Uncertain    Medication Notes:        AED Medication Compliance:  compliant  most of the time    Diagnostic studies reviewed:  10/18/2022:  SODIUM  135 - 145 mmol/L 141     VALPROIC ACID,TOTAL  50.0 - 100.0 ug/mL 42.8 Low  [facility] LABORATORY   DATE OF LAST DOSE         3/10/2021      VALPROIC ACID,FREE        6.0 - 20.0 ug/mL <6.0 Low    DATE OF LAST DOSE            Comment: 3/10/2021     AMMONIA  11 - 35 umol/L 39 High      ETHOSUXIMIDE              40.0 - 100.0 ug/ml 92.9   Resulting Agency [facility]     Specimen Collected: 12/23/20      ALT (SGPT)  8 - 45 IU/L 9   AST (SGOT)  2 - 40 IU/L 14   Resulting [facility] LABORATORY     Specimen Collected: 10/08/20     Review of Systems:  No urinary frequency, endorses strong smelling urine.  Lethargy / Tiredness:  No  Nausea / Vomiting:  No  Sleepiness:  stable  Poor Balance:  Yes - frequently, increased  Behavioral Changes:  requiring more redirection. She has been eating bowel movements  Have you experienced a traumatic fall since your last visit: YES - sprained ankles  Are these falls related to your seizures:  NO      Exam:    Temp 97.2  F (36.2  C) (Temporal)      Wt Readings from Last 5 Encounters:   06/09/22 138 lb (62.6 kg)   05/27/21 137 lb 3.2 oz (62.2 kg)         Wide based gait, walks on toes with shuffling. Able to rise from a seated position on her own. Able to turn off the lights and open the door on her own. Strength is intact in upper extremities. Dysconjugate gaze. Mouth open for most of visit with a smile. Nonverbal. Some shaking of upper extremities, appeared behavioral    Assessment and Plan:   Ms. Linton has a history of well controlled epilepsy, in the setting of Angelman syndrome. Her last known seizure was in 2009. She has been having increasing falls, tremors, and drooling, which they are concerned may be medication related. She has also had recent behavioral issues, with possible urinary tract infection. She has not tolerated MRI or EEG studies.     At this time, I have  recommended updated lab work for medication monitoring, with her increase in falls and drooling. I have not made any medication changes at this time, though will reach out if any concerns on the lab work.    I have recommended they discuss the possibility of a home safety evaluation with primary care regarding their concerns with her grab bars, and also to evaluate for a urinary tract infection with her strong smelling urine and recent behavioral changes. She requires catheterization for a urinalysis.    At their request, guidance was provided on how to manage missed doses of her seizure medications, and when to call 911 for her seizures.    Ms. Linton will follow up with myself or Dr. Lofton in three months. If they have questions, concerns, or worsening symptoms, they were encouraged to contact the clinic.    Thank you for letting me participate in your patient's care.    As described above, I met with the patient for 30 minutes and during this time counseling was greater than 50% of the visit time.          Again, thank you for allowing me to participate in the care of your patient.      Sincerely,    Raven Urbano PA-C

## 2024-02-22 NOTE — PATIENT INSTRUCTIONS
We will check labs for monitoring with the increased falls and drooling.     Continue current medications for now    Discuss the possibility of a home safety evaluation with primary care regarding the grab bars, etc, and possible urinary tract infection with the strong smelling urine and behavioral changes    Follow up in 3 months with myself or Dr. Lofton. Call with questions, concerns, or worsening symptoms.    Missed Doses:  IF YOU REALIZE WITHIN 24 HOURS:  ...You MISSED ONE DOSE of your anticonvulsant medication(s), take the missed dose immediately unless it is time for your next scheduled dose. If that is the case, take your next scheduled dose, wait two hours, and then take the missed dose.  ...You MISSED TWO OR MORE DOSES, take one of the missed doses immediately, even if it is time for your next scheduled dose. Then call the [facility] [place] clinic to schedule an appointment.   IF YOU REALIZE NOW YOU MISSED A DOSE MORE THAN 24 HOURS AGO, nallely it on your calendar. DO NOT take an extra dose.  An extra dose of an antiepileptic drug is not serious. Ordinarily, at most, you may experience increased side effects for several hours.     When to call 911 for Seizures:  Call 911 if:  The person does not start breathing within 1 minute after the seizure. If this happens call 911 immediately and start CPR.  The person sustains an injury  The person has one seizure right after another without regaining consciousness in between  A GTC seizure lasts over 5 minutes  The person requests an ambulance

## 2024-02-22 NOTE — PROGRESS NOTES
[facility] Epilepsy Care Progress Note      Patient:  Eli Linton  :  1995   Age:  28 year old   Today's Office Visit:  2024    Epilepsy Data:  According to the medical record, she had history of seizures since she was about 1 years old.  She has been on ethosuximide and Depakote for many years, and her seizures were well controlled.  Her last known seizure was over 10 years ago.  It was reported that she had the focal impaired seizures and the generalized seizures when she was young.     It was reported that her last suspected seizure was in .  Her current seizure medications are:  Ethosuximide 550 mg b.i.d. and Depakote 375 mg in the morning and 500 mg in the evening.     For the past 6-8 months, she started to have increased falls recently and she also has bilateral arm shaking, sometimes can be fairly dramatic.  She can have arm shaking for like 8 hours a day.  She does not lose consciousness with these shaking, and sometimes she will fall.  It seems that she does not have bad injuries related to these falls.  When she has bad shaking, she may need assistance for walking.     Unfortunately, the patient cannot tolerate MRI or EEG so these were not done during the recent years.        History of Present Illness:   Ms. Linton was last seen by Dr. Rolly mejía on 2023. At the time of the patient's last visit, she had history of epilepsy, well controlled, with her last seizure in . She had a history of Angelman syndrome which was stable. She had recent spells of arm shaking and related falls, etiology not clear, though not felt to be seizures. Movement consultation versus psychiatry was suggested. Her depakote was decreased with this, ammonia was normal with asterixis a possibility but less likely. She could not tolerate an MRI or EEG, and was well controlled. She was to continue Depakote 375mg twice daily and Zarontin 550mg twice daily. She was to follow up in 1 year.      Today, Ms. Linton presents with two group home staff. They note she is having increasing falls. They are concerned it may be related to her seizure medications. She can slip and fall. The tremor, drooling and falls are daily.     With her shaking, she sometimes needs assistance with feeding.     She is having more problems with behavioral issues recently. There is question for possible UTI.     She has not had any seizure since 2009.      She was having issues with drooling in the past when her depakote was high.     She has had increased unsteadiness, with delays in initiation of movement. She is having more trouble with her grab bars with her shaking. They are using a gait belt with her unsteadiness.    She is sleeping well, getting greater than 5 hours. They state she is unpredictable. She is aware of shift changes which impacts her sleep.    Current Outpatient Medications   Medication Sig Dispense Refill    acetaminophen (TYLENOL) 32 mg/mL liquid Take 160 mg by mouth every 6 hours as needed for fever or mild pain      divalproex sodium delayed-release (DEPAKOTE SPRINKLE) 125 MG DR capsule Take 3 tabs (375 mg) in am and 3 tabs (375 mg) at night 180 capsule 11    ethosuximide (ZARONTIN) 250 MG/5ML solution Take 11 mLs (550 mg) by mouth 2 times daily 700 mL 11    ibuprofen (ADVIL/MOTRIN) 100 MG/5ML suspension Take 400 mg by mouth as needed      magnesium hydroxide (MILK OF MAGNESIA) 400 MG/5ML suspension Take 5 mLs by mouth as needed      polyethylene glycol (MIRALAX) 17 g packet Take 1 packet by mouth daily          Perceived AED Side Effects:  Uncertain    Medication Notes:        AED Medication Compliance:  compliant most of the time    Diagnostic studies reviewed:  10/18/2022:  SODIUM  135 - 145 mmol/L 141     VALPROIC ACID,TOTAL  50.0 - 100.0 ug/mL 42.8 Low  [facility]   DATE OF LAST DOSE         3/10/2021      VALPROIC ACID,FREE        6.0 - 20.0 ug/mL <6.0 Low    DATE OF  LAST DOSE            Comment: 3/10/2021     AMMONIA  11 - 35 umol/L 39 High      ETHOSUXIMIDE              40.0 - 100.0 ug/ml 92.9   Resulting Agency [facility]     Specimen Collected: 12/23/20      ALT (SGPT)  8 - 45 IU/L 9   AST (SGOT)  2 - 40 IU/L 14   Resulting Agency [facility]     Specimen Collected: 10/08/20     Review of Systems:  No urinary frequency, endorses strong smelling urine.  Lethargy / Tiredness:  No  Nausea / Vomiting:  No  Sleepiness:  stable  Poor Balance:  Yes - frequently, increased  Behavioral Changes:  requiring more redirection. She has been eating bowel movements  Have you experienced a traumatic fall since your last visit: YES - sprained ankles  Are these falls related to your seizures:  NO      Exam:    Temp 97.2  F (36.2  C) (Temporal)      Wt Readings from Last 5 Encounters:   06/09/22 138 lb (62.6 kg)   05/27/21 137 lb 3.2 oz (62.2 kg)         Wide based gait, walks on toes with shuffling. Able to rise from a seated position on her own. Able to turn off the lights and open the door on her own. Strength is intact in upper extremities. Dysconjugate gaze. Mouth open for most of visit with a smile. Nonverbal. Some shaking of upper extremities, appeared behavioral    Assessment and Plan:   Ms. Linton has a history of well controlled epilepsy, in the setting of Angelman syndrome. Her last known seizure was in 2009. She has been having increasing falls, tremors, and drooling, which they are concerned may be medication related. She has also had recent behavioral issues, with possible urinary tract infection. She has not tolerated MRI or EEG studies.     At this time, I have recommended updated lab work for medication monitoring, with her increase in falls and drooling. I have not made any medication changes at this time, though will reach out if any concerns on the lab work.    I have recommended they discuss the possibility of a home safety evaluation with primary  care regarding their concerns with her grab bars, and also to evaluate for a urinary tract infection with her strong smelling urine and recent behavioral changes. She requires catheterization for a urinalysis.    At their request, guidance was provided on how to manage missed doses of her seizure medications, and when to call 911 for her seizures.    Ms. Linton will follow up with myself or Dr. Lofton in three months. If they have questions, concerns, or worsening symptoms, they were encouraged to contact the clinic.    Thank you for letting me participate in your patient's care.    As described above, I met with the patient for 30 minutes and during this time counseling was greater than 50% of the visit time.

## 2024-05-07 DIAGNOSIS — G40.909 RECURRENT SEIZURES (H): ICD-10-CM

## 2024-05-16 RX ORDER — DIVALPROEX SODIUM 125 MG/1
CAPSULE, COATED PELLETS ORAL
Qty: 186 CAPSULE | Refills: 10 | Status: SHIPPED | OUTPATIENT
Start: 2024-05-16 | End: 2024-09-05

## 2024-05-16 NOTE — TELEPHONE ENCOUNTER
divalproex sodium delayed-release (DEPAKOTE SPRINKLE) 125 MG DR capsule 180 capsule 11 5/4/2023       Last Office Visit: 2/22/24  Future Office visit:   5/23/24    AST:20 ON 2/23/24  VALPROIC ACID: 51.6 ON 2/23/24  PLATELETS: 225 ON 2/23/24    Refill protocol passed    Rose Lane RN  P Red Flag Triage/MRT

## 2024-05-28 DIAGNOSIS — G40.909 RECURRENT SEIZURES (H): ICD-10-CM

## 2024-05-30 RX ORDER — ETHOSUXIMIDE 250 MG/5ML
SOLUTION ORAL
Qty: 700 ML | Refills: 9 | Status: SHIPPED | OUTPATIENT
Start: 2024-05-30

## 2024-05-30 NOTE — TELEPHONE ENCOUNTER
Incoming call from pharmacy. Patient is out of medication. Pharmacy needs new prescription for this medication.

## 2024-09-05 ENCOUNTER — OFFICE VISIT (OUTPATIENT)
Dept: NEUROLOGY | Facility: CLINIC | Age: 29
End: 2024-09-05
Payer: COMMERCIAL

## 2024-09-05 VITALS — WEIGHT: 167 LBS | BODY MASS INDEX: 26.84 KG/M2 | HEIGHT: 66 IN

## 2024-09-05 DIAGNOSIS — G40.909 RECURRENT SEIZURES (H): ICD-10-CM

## 2024-09-05 DIAGNOSIS — R25.1 EPISODE OF SHAKING: ICD-10-CM

## 2024-09-05 DIAGNOSIS — R29.6 RECURRENT FALLS: ICD-10-CM

## 2024-09-05 DIAGNOSIS — Q93.51 ANGELMAN SYNDROME: Primary | ICD-10-CM

## 2024-09-05 RX ORDER — QUETIAPINE FUMARATE 25 MG/1
TABLET, FILM COATED ORAL
COMMUNITY
Start: 2024-09-05

## 2024-09-05 RX ORDER — DIVALPROEX SODIUM 125 MG/1
CAPSULE, COATED PELLETS ORAL
Qty: 210 CAPSULE | Refills: 10 | Status: SHIPPED | OUTPATIENT
Start: 2024-09-05

## 2024-09-05 NOTE — LETTER
2024       RE: Eli Linton  : 1995   MRN: [id number]      Dear Colleague,    Thank you for referring your patient, Eli Litnon, to the [facility] EPILEPSY CARE at [facility]. Please see a copy of my visit note below.    [facility] Epilepsy Care Progress Note      Patient:  Eli Linton  :  1995   Age:  29 year old   Today's Office Visit:  2024  Chief Complaint: Follow up seizures, shaking spells, behavioral changes    Epilepsy Data:  According to the medical record, she had history of seizures since she was about 1 years old.  She has been on ethosuximide and Depakote for many years, and her seizures were well controlled.  Her last known seizure was over 10 years ago.  It was reported that she had the focal impaired seizures and the generalized seizures when she was young.     It was reported that her last suspected seizure was in .  Her current seizure medications are:  Ethosuximide 550 mg b.i.d. and Depakote 375 mg in the morning and 500 mg in the evening.     For the past 6-8 months, she started to have increased falls recently and she also has bilateral arm shaking, sometimes can be fairly dramatic.  She can have arm shaking for like 8 hours a day.  She does not lose consciousness with these shaking, and sometimes she will fall.  It seems that she does not have bad injuries related to these falls.  When she has bad shaking, she may need assistance for walking.     Unfortunately, the patient cannot tolerate MRI or EEG so these were not done during the recent years.      History of Present Illness:   Ms. Linton was last seen by myself on 2024. At the time of the patient's last visit, she had history of epilepsy, well controlled, with her last seizure in . She had Angelman syndrome with increasing falls, tremor and drooling.  She had prolonged history of arm shaking with related falls, of unclear  etiology, though felt to be fairly clear this was not representing seizures. She was having recent behavioral concerns with possible urinary tract infection. She could not tolerate MRI or EEG evaluations. Labs were ordered for further evaluation. She required catheterization for urinalysis, which we were not able to complete at our lab. They were to review having a urinalysis and possible home safety evaluation with primary care. She was to continue current dosing of her medications.    Today, Ms. Linton presents with two staff from [facility], new to the patient within the last six months. She has been shaking more and will fall backward. She has been caught when she falls back and has not hit the ground. They are not allowed to take videos of these events for further evaluation. They are initially adamant these are the same types of shaking and falls she was having previously, but are more intense. Upon further discussion regarding the shaking spells, it appears these events have possibly evolved or may be different than prior.     The shaking can last 30 seconds to 1 minute and 30 seconds. She flexes her arms with her hands in a fist and her whole body becomes tense. They state she will lose control of bladder during these events. They state she will be tired after the shaking spells, and can have some trouble getting up afterward. These can happen many times per hour. She does not lose consciousness.    They state she is having behavioral issues, and has seen psychiatry. She has been prescribed 25mg of Seroquel as needed, but has not needed to use this yet. She is eating her feces, menses, pulling hair, and biting. They state she is not having issues with using grab bars as discussed at her last visit.     She is sleeping through the night once she falls asleep. She may get up two the three times initially. She goes to bed between 9 and 10 PM and wakes at 6AM.     They deny any sign of infection. She does not  have any increased stress. There has been no  change in medications.      She has not had any known seizures since last seen. She is not having side effects with her medications that they are aware of.    She has not been evaluated by the movement team for her shaking spells as previously discussed.    Staff indicates she communicates her needs by leading them to what she wants, such as to the pantry when hungry.      Current Outpatient Medications   Medication Sig Dispense Refill     acetaminophen (TYLENOL) 32 mg/mL liquid Take 160 mg by mouth every 6 hours as needed for fever or mild pain       divalproex sodium delayed-release (DEPAKOTE SPRINKLE) 125 MG DR capsule TAKE 3 CAPSULES (375MG) BY MOUTH TWICE DAILY ( MORNING AND EVENING) 186 capsule 10     ethosuximide (ZARONTIN) 250 MG/5ML solution TAKE 11ML (550MG) BY MOUTH TWICE DAILY. **STOPPERS, LUIZ BOTTLES ONLY** *ORDER WHEN LOW* 700 mL 9     ibuprofen (ADVIL/MOTRIN) 100 MG/5ML suspension Take 400 mg by mouth as needed       magnesium hydroxide (MILK OF MAGNESIA) 400 MG/5ML suspension Take 5 mLs by mouth as needed       polyethylene glycol (MIRALAX) 17 g packet Take 1 packet by mouth daily          Perceived AED Side Effects:  No    Medication Notes:        AED Medication Compliance:  compliant most of the time  Using a pill box:  staff administer    2/23/2024  ETHOSUXIMIDE              40.0 - 100.0 ug/ml 56.5     AMMONIA  16 - 60 umol/L 42     VALPROIC ACID,TOTAL  50.0 - 100.0 ug/mL 51.6     VALPROIC ACID,FREE  6.0 - 20.0 ug/mL <2.8 Low      7/9/2024  WHITE BLOOD COUNT          4.5 - 11.0 thou/cu mm 8.5   RED BLOOD COUNT            4.00 - 5.20 mil/cu mm 4.19   HEMOGLOBIN                12.0 - 16.0 g/dL 12.9   HEMATOCRIT                33.0 - 51.0 % 38.3   MCV                        80 - 100 fL 91   MCH                        26.0 - 34.0 pg 30.8   MCHC                      32.0 - 36.0 g/dL 33.7   RDW                        11.5 - 15.5 % 11.6    PLATELET COUNT            140 - 440 thou/cu mm 217   MPV                        6.5 - 11.0 fL 11.1 High    NRBC                      % 0.0   ABS NRBC  thou /cu mm 0.0   % NEUT  % 45.5   % LYMPH  % 43.6   % MONO  % 7.6   % EOS  % 2.6   % BASO  % 0.6   % IMMATURE GRAN (METAS,MYELOS,PROS)  % 0.1   ABSOLUTE NEUTROPHILS      1.7 - 7.0 thou/cu mm 3.9   ABSOLUTE LYMPHOCYTES      0.9 - 2.9 thou/cu mm 3.7 High    ABSOLUTE MONOCYTES        <0.9 thou/cu mm 0.7   ABSOLUTE EOSINOPHILS      <0.5 thou/cu mm 0.2   ABSOLUTE BASOPHILS        <0.3 thou/cu mm 0.1   ABSOLUTE IMMATURE GRANULOCYTES(METAS,MYELOS,PROS)  <0.3 thou/cu mm 0.0     7/9/2024  SODIUM  136 - 145 mmol/L 140   POTASSIUM  3.5 - 5.1 mmol/L 4.5   CHLORIDE  98 - 107 mmol/L 105   CO2,TOTAL  22 - 29 mmol/L 25   ANION GAP  5 - 18 10   GLUCOSE  70 - 99 mg/dL 91   CALCIUM  8.6 - 10.0 mg/dL 9.5   BUN  6 - 20 mg/dL 21 High    CREATININE  0.50 - 0.90 mg/dL 0.62   BUN/CREAT RATIO  10 - 20 34 High    eGFR  >90 mL/min/1.73m2 >90   Comment: As of 03/15/2022, eGFR is calculated by the CKD-EPI creatinine equation without race adjustment.  eGFR can be influenced by muscle mass, exercise, and diet.  The reported eGFR is an estimation only and is only applicable if the renal function is stable.   ALBUMIN  4.0 - 4.9 g/dL 4.6   PROTEIN,TOTAL  6.0 - 8.0 g/dL 7.5   BILIRUBIN,TOTAL  0.0 - 1.2 mg/dL 0.2   ALK PHOSPHATASE  35 - 104 IU/L 89   ALT (SGPT)  10 - 35 IU/L 6 Low    AST (SGOT)  10 - 35 IU/L 19     Review of Systems:  Sleepiness:  Yes - going to bed earlier on her own  Behavioral Changes:  Yes  Have you experienced a traumatic fall since your last visit: NO      Other Issues:    Is patient safe to drive:  No      Exam:    There were no vitals taken for this visit.     Wt Readings from Last 5 Encounters:   06/09/22 138 lb (62.6 kg)   05/27/21 137 lb 3.2 oz (62.2 kg)     Wide based gait, walks on toes with shuffling, holding onto staff for longer distances. Able to rise from a seated  position on her own and ambulate unassisted. Does throw herself back at one point, with staff catching her, which they state is different from the shaking with falling backward. Dysconjugate gaze. Mouth open for most of visit with a smile. Nonverbal.     Assessment and Plan:   Ms. Linton has history of epilepsy in the setting of Angelman syndrome. Her last clear seizure was in 2009. She would not tolerate imaging or EEG studies. She is having increasing shaking spells with whole body stiffening and flexing of the arms, urinary incontinence, and falls, unclear if these are the same as the previously described shaking spells which were not felt to represent seizure. She is having increasing behavioral issues, working with psychiatry.     With the added urinary incontinence associated with the shaking spells, and increasing behavioral events, we can consider a slight increase in her Depakote to 375-500 to see if this provides any benefit. Her Depakote level is in the low therapeutic range, with normal ammonia level and liver function. I would still like to have her seen by the movement team, which had been recommended for similar if not the same events in the past.     I have advised them to continue to work with psychiatry for her behavioral changes.    Ms. Linton will follow up in 3 months with Dr. Lofton. If they have questions, concerns, or worsening symptoms in the meantime they were advised to contact the clinic.      I spent 47 minutes in total today to provide comprehensive medical care; Face to face time 30 minutes.  I spent 9 minutes writing the note and placing orders.   I spent 8 minutes reviewing the chart.     The rest of the time was spent with the patient in face to face interview. During this time key medical decisions were made with review of medical chart prior to visit, visit with patient, counseling/education, and post visit work, including documentation of note on the day of visit. I addressed all  questions the patient/caregiver raised in regards to epilepsy or related medical questions.   The longitudinal plan of care for the diagnosis(es)/condition(s) as documented were addressed during this visit. Due to the added complexity in care, I will continue to support Eli in the subsequent management and with ongoing continuity of care.                      Again, thank you for allowing me to participate in the care of your patient.      Sincerely,    Raven Urbano PA-C

## 2024-09-05 NOTE — PATIENT INSTRUCTIONS
We will increase depakote to 375mg in the morning and 500mg in the evening    Continue with psychiatry. The increase in depakote may help some with the behaviors as well.    We will have you see the movement team for the shaking as previously recommended.    Follow up in 3 months with Dr. Lofton. Call sooner with questions, concerns, or worsening symptoms.

## 2024-09-05 NOTE — PROGRESS NOTES
[facility] Epilepsy Care Progress Note      Patient:  Eli Linton  :  1995   Age:  29 year old   Today's Office Visit:  2024  Chief Complaint: Follow up seizures, shaking spells, behavioral changes    Epilepsy Data:  According to the medical record, she had history of seizures since she was about 1 years old.  She has been on ethosuximide and Depakote for many years, and her seizures were well controlled.  Her last known seizure was over 10 years ago.  It was reported that she had the focal impaired seizures and the generalized seizures when she was young.     It was reported that her last suspected seizure was in .  Her current seizure medications are:  Ethosuximide 550 mg b.i.d. and Depakote 375 mg in the morning and 500 mg in the evening.     For the past 6-8 months, she started to have increased falls recently and she also has bilateral arm shaking, sometimes can be fairly dramatic.  She can have arm shaking for like 8 hours a day.  She does not lose consciousness with these shaking, and sometimes she will fall.  It seems that she does not have bad injuries related to these falls.  When she has bad shaking, she may need assistance for walking.     Unfortunately, the patient cannot tolerate MRI or EEG so these were not done during the recent years.      History of Present Illness:   Ms. Linton was last seen by myself on 2024. At the time of the patient's last visit, she had history of epilepsy, well controlled, with her last seizure in . She had Angelman syndrome with increasing falls, tremor and drooling.  She had prolonged history of arm shaking with related falls, of unclear etiology, though felt to be fairly clear this was not representing seizures. She was having recent behavioral concerns with possible urinary tract infection. She could not tolerate MRI or EEG evaluations. Labs were ordered for further evaluation. She required catheterization for urinalysis, which  we were not able to complete at our lab. They were to review having a urinalysis and possible home safety evaluation with primary care. She was to continue current dosing of her medications.    Today, Ms. Linton presents with two staff from [facility], new to the patient within the last six months. She has been shaking more and will fall backward. She has been caught when she falls back and has not hit the ground. They are not allowed to take videos of these events for further evaluation. They are initially adamant these are the same types of shaking and falls she was having previously, but are more intense. Upon further discussion regarding the shaking spells, it appears these events have possibly evolved or may be different than prior.     The shaking can last 30 seconds to 1 minute and 30 seconds. She flexes her arms with her hands in a fist and her whole body becomes tense. They state she will lose control of bladder during these events. They state she will be tired after the shaking spells, and can have some trouble getting up afterward. These can happen many times per hour. She does not lose consciousness.    They state she is having behavioral issues, and has seen psychiatry. She has been prescribed 25mg of Seroquel as needed, but has not needed to use this yet. She is eating her feces, menses, pulling hair, and biting. They state she is not having issues with using grab bars as discussed at her last visit.     She is sleeping through the night once she falls asleep. She may get up two the three times initially. She goes to bed between 9 and 10 PM and wakes at 6AM.     They deny any sign of infection. She does not have any increased stress. There has been no  change in medications.      She has not had any known seizures since last seen. She is not having side effects with her medications that they are aware of.    She has not been evaluated by the movement team for her shaking spells as  previously discussed.    Staff indicates she communicates her needs by leading them to what she wants, such as to the pantry when hungry.      Current Outpatient Medications   Medication Sig Dispense Refill    acetaminophen (TYLENOL) 32 mg/mL liquid Take 160 mg by mouth every 6 hours as needed for fever or mild pain      divalproex sodium delayed-release (DEPAKOTE SPRINKLE) 125 MG DR capsule TAKE 3 CAPSULES (375MG) BY MOUTH TWICE DAILY ( MORNING AND EVENING) 186 capsule 10    ethosuximide (ZARONTIN) 250 MG/5ML solution TAKE 11ML (550MG) BY MOUTH TWICE DAILY. **STOPPERS, LUIZ BOTTLES ONLY** *ORDER WHEN LOW* 700 mL 9    ibuprofen (ADVIL/MOTRIN) 100 MG/5ML suspension Take 400 mg by mouth as needed      magnesium hydroxide (MILK OF MAGNESIA) 400 MG/5ML suspension Take 5 mLs by mouth as needed      polyethylene glycol (MIRALAX) 17 g packet Take 1 packet by mouth daily          Perceived AED Side Effects:  No    Medication Notes:        AED Medication Compliance:  compliant most of the time  Using a pill box:  staff administer    2/23/2024  ETHOSUXIMIDE              40.0 - 100.0 ug/ml 56.5     AMMONIA  16 - 60 umol/L 42     VALPROIC ACID,TOTAL  50.0 - 100.0 ug/mL 51.6     VALPROIC ACID,FREE  6.0 - 20.0 ug/mL <2.8 Low      7/9/2024  WHITE BLOOD COUNT          4.5 - 11.0 thou/cu mm 8.5   RED BLOOD COUNT            4.00 - 5.20 mil/cu mm 4.19   HEMOGLOBIN                12.0 - 16.0 g/dL 12.9   HEMATOCRIT                33.0 - 51.0 % 38.3   MCV                        80 - 100 fL 91   MCH                        26.0 - 34.0 pg 30.8   MCHC                      32.0 - 36.0 g/dL 33.7   RDW                        11.5 - 15.5 % 11.6   PLATELET COUNT            140 - 440 thou/cu mm 217   MPV                        6.5 - 11.0 fL 11.1 High    NRBC                      % 0.0   ABS NRBC  thou /cu mm 0.0   % NEUT  % 45.5   % LYMPH  % 43.6   % MONO  % 7.6   % EOS  % 2.6   % BASO  % 0.6   % IMMATURE GRAN (METAS,MYELOS,PROS)  % 0.1    ABSOLUTE NEUTROPHILS      1.7 - 7.0 thou/cu mm 3.9   ABSOLUTE LYMPHOCYTES      0.9 - 2.9 thou/cu mm 3.7 High    ABSOLUTE MONOCYTES        <0.9 thou/cu mm 0.7   ABSOLUTE EOSINOPHILS      <0.5 thou/cu mm 0.2   ABSOLUTE BASOPHILS        <0.3 thou/cu mm 0.1   ABSOLUTE IMMATURE GRANULOCYTES(METAS,MYELOS,PROS)  <0.3 thou/cu mm 0.0     7/9/2024  SODIUM  136 - 145 mmol/L 140   POTASSIUM  3.5 - 5.1 mmol/L 4.5   CHLORIDE  98 - 107 mmol/L 105   CO2,TOTAL  22 - 29 mmol/L 25   ANION GAP  5 - 18 10   GLUCOSE  70 - 99 mg/dL 91   CALCIUM  8.6 - 10.0 mg/dL 9.5   BUN  6 - 20 mg/dL 21 High    CREATININE  0.50 - 0.90 mg/dL 0.62   BUN/CREAT RATIO  10 - 20 34 High    eGFR  >90 mL/min/1.73m2 >90   Comment: As of 03/15/2022, eGFR is calculated by the CKD-EPI creatinine equation without race adjustment.  eGFR can be influenced by muscle mass, exercise, and diet.  The reported eGFR is an estimation only and is only applicable if the renal function is stable.   ALBUMIN  4.0 - 4.9 g/dL 4.6   PROTEIN,TOTAL  6.0 - 8.0 g/dL 7.5   BILIRUBIN,TOTAL  0.0 - 1.2 mg/dL 0.2   ALK PHOSPHATASE  35 - 104 IU/L 89   ALT (SGPT)  10 - 35 IU/L 6 Low    AST (SGOT)  10 - 35 IU/L 19     Review of Systems:  Sleepiness:  Yes - going to bed earlier on her own  Behavioral Changes:  Yes  Have you experienced a traumatic fall since your last visit: NO      Other Issues:    Is patient safe to drive:  No      Exam:    There were no vitals taken for this visit.     Wt Readings from Last 5 Encounters:   06/09/22 138 lb (62.6 kg)   05/27/21 137 lb 3.2 oz (62.2 kg)     Wide based gait, walks on toes with shuffling, holding onto staff for longer distances. Able to rise from a seated position on her own and ambulate unassisted. Does throw herself back at one point, with staff catching her, which they state is different from the shaking with falling backward. Dysconjugate gaze. Mouth open for most of visit with a smile. Nonverbal.     Assessment and Plan:   Ms. 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 Her last clear seizure was in 2009. She would not tolerate imaging or EEG studies. She is having increasing shaking spells with whole body stiffening and flexing of the arms, urinary incontinence, and falls, unclear if these are the same as the previously described shaking spells which were not felt to represent seizure. She is having increasing behavioral issues, working with psychiatry.     With the added urinary incontinence associated with the shaking spells, and increasing behavioral events, we can consider a slight increase in her Depakote to 375-500 to see if this provides any benefit. Her Depakote level is in the low therapeutic range, with normal ammonia level and liver function. I would still like to have her seen by the movement team, which had been recommended for similar if not the same events in the past.     I have advised them to continue to work with psychiatry for her behavioral changes.    Ms. Linton will follow up in 3 months with Dr. Lofton. If they have questions, concerns, or worsening symptoms in the meantime they were advised to contact the clinic.      I spent 47 minutes in total today to provide comprehensive medical care; Face to face time 30 minutes.  I spent 9 minutes writing the note and placing orders.   I spent 8 minutes reviewing the chart.     The rest of the time was spent with the patient in face to face interview. During this time key medical decisions were made with review of medical chart prior to visit, visit with patient, counseling/education, and post visit work, including documentation of note on the day of visit. I addressed all questions the patient/caregiver raised in regards to epilepsy or related medical questions.   The longitudinal plan of care for the diagnosis(es)/condition(s) as documented were addressed during this visit. Due to the added complexity in care, I will continue to support Eli in the subsequent  management and with ongoing continuity of care.

## 2024-09-18 DIAGNOSIS — R25.1 TREMOR: Primary | ICD-10-CM

## 2024-09-18 PROBLEM — G40.909 EPILEPSY (H): Status: ACTIVE | Noted: 2020-11-13

## 2024-09-18 PROBLEM — K05.6 PERIODONTAL DISEASE: Status: ACTIVE | Noted: 2024-08-15

## 2024-09-18 PROBLEM — G40.909 SEIZURE DISORDER (H): Status: ACTIVE | Noted: 2023-10-24

## 2024-09-18 RX ORDER — BISACODYL 10 MG
10 SUPPOSITORY, RECTAL RECTAL DAILY PRN
COMMUNITY

## 2024-09-18 RX ORDER — GUAIFENESIN AND DEXTROMETHORPHAN HYDROBROMIDE 100; 10 MG/5ML; MG/5ML
10 SOLUTION ORAL
COMMUNITY

## 2024-09-18 RX ORDER — POLYETHYLENE GLYCOL 3350 17 G/17G
POWDER, FOR SOLUTION ORAL
COMMUNITY
End: 2024-09-18

## 2024-09-18 RX ORDER — PSYLLIUM HUSK 3 G/5.8 G
POWDER (GRAM) ORAL
COMMUNITY
Start: 2024-08-01

## 2024-09-18 RX ORDER — FLUCONAZOLE 150 MG/1
TABLET ORAL
COMMUNITY

## 2024-09-18 RX ORDER — LORATADINE 10 MG/1
10 TABLET ORAL DAILY PRN
COMMUNITY

## 2024-09-18 RX ORDER — ACETAMINOPHEN AND CODEINE PHOSPHATE 120; 12 MG/5ML; MG/5ML
1 SOLUTION ORAL
COMMUNITY
Start: 2024-07-02 | End: 2024-09-18

## 2024-09-18 RX ORDER — LOPERAMIDE HCL 2 MG
2 CAPSULE ORAL
COMMUNITY

## 2024-09-18 RX ORDER — NYSTATIN 100000 U/G
OINTMENT TOPICAL
COMMUNITY
Start: 2024-06-20

## 2024-09-18 RX ORDER — ACETAMINOPHEN AND CODEINE PHOSPHATE 120; 12 MG/5ML; MG/5ML
0.35 SOLUTION ORAL DAILY
COMMUNITY
Start: 2024-07-02

## 2024-09-18 RX ORDER — CRANBERRY FRUIT EXTRACT 250 MG
CAPSULE ORAL
COMMUNITY
Start: 2023-04-25

## 2024-09-18 RX ORDER — PYRIDOXINE HCL (VITAMIN B6) 100 MG
TABLET ORAL
COMMUNITY

## 2024-09-18 RX ORDER — GINSENG 100 MG
CAPSULE ORAL
COMMUNITY
Start: 2024-08-13

## 2024-09-18 NOTE — PROGRESS NOTES
[address]  [facility] [address]  Mobile Phone  [phone]  Email  [email or web address]    Tremor/Angelman Syndrome - Recs in epic - Scheduled per pt - Referred by BRUCE MICHEL ([place] GUARDIAN) Fahning  Legal Guardian  Emergency Contact, Father    Hailey ([place] GUARDIAN) Fahning  Legal Guardian  Emergency Contact, Mother      Medications            Acetaminophen tylenol liquid        Bacitracin ointment         Bisacodyl dulcolax 10mg supp        Cranberry 250mg         Cranberry 500mg        Dextromethorphan guaifenesin         Divalproex delayed release depakote sprink 125mg        Ethosuximide zarontin 250mg/5ml soln        Fluconazole diflucan 150mg        Ibuprofen advil 100mg/5ml susp        Loperamide imodium 2mg        Loratadine claritin 10mg  prn       Mag hydroxide milk of magnesia         Nonformulary compressive stockings         NOrethindrone micronor 0.25mg        Nystatin mycostatin ointment         POlyethylene glycol miralax         Psyllium powder        Quetiapine seroquel 25mg prn                                                     Patient:  Eli ANDRADE Royer  :  1995   Age:  29 year old   Today's Office Visit:  2024  Chief Complaint: Follow up seizures, shaking spells, behavioral changes     Epilepsy Data:  According to the medical record, she had history of seizures since she was about 1 years old.  She has been on ethosuximide and Depakote for many years, and her seizures were well controlled.  Her last known seizure was over 10 years ago.  It was reported that she had the focal impaired seizures and the generalized seizures when she was young.     It was reported that her last suspected seizure was in .  Her current seizure medications are:  Ethosuximide 550 mg b.i.d. and Depakote 375 mg in the morning and 500 mg in the evening.     For the past 6-8 months, she started to have increased falls recently and she also has bilateral arm shaking, sometimes can  be fairly dramatic.  She can have arm shaking for like 8 hours a day.  She does not lose consciousness with these shaking, and sometimes she will fall.  It seems that she does not have bad injuries related to these falls.  When she has bad shaking, she may need assistance for walking.     Unfortunately, the patient cannot tolerate MRI or EEG so these were not done during the recent years.        History of Present Illness:   Ms. Linton was last seen by myself on 2/22/2024. At the time of the patient's last visit, she had history of epilepsy, well controlled, with her last seizure in 2009. She had Angelman syndrome with increasing falls, tremor and drooling.  She had prolonged history of arm shaking with related falls, of unclear etiology, though felt to be fairly clear this was not representing seizures. She was having recent behavioral concerns with possible urinary tract infection. She could not tolerate MRI or EEG evaluations. Labs were ordered for further evaluation. She required catheterization for urinalysis, which we were not able to complete at our lab. They were to review having a urinalysis and possible home safety evaluation with primary care. She was to continue current dosing of her medications.     Today, Ms. Linton presents with two staff from [facility], new to the patient within the last six months. She has been shaking more and will fall backward. She has been caught when she falls back and has not hit the ground. They are not allowed to take videos of these events for further evaluation. They are initially adamant these are the same types of shaking and falls she was having previously, but are more intense. Upon further discussion regarding the shaking spells, it appears these events have possibly evolved or may be different than prior.      The shaking can last 30 seconds to 1 minute and 30 seconds. She flexes her arms with her hands in a fist and her whole body becomes tense. They state she  will lose control of bladder during these events. They state she will be tired after the shaking spells, and can have some trouble getting up afterward. These can happen many times per hour. She does not lose consciousness.     They state she is having behavioral issues, and has seen psychiatry. She has been prescribed 25mg of Seroquel as needed, but has not needed to use this yet. She is eating her feces, menses, pulling hair, and biting. They state she is not having issues with using grab bars as discussed at her last visit.      She is sleeping through the night once she falls asleep. She may get up two the three times initially. She goes to bed between 9 and 10 PM and wakes at 6AM.      They deny any sign of infection. She does not have any increased stress. There has been no  change in medications.       She has not had any known seizures since last seen. She is not having side effects with her medications that they are aware of.     She has not been evaluated by the movement team for her shaking spells as previously discussed.     Staff indicates she communicates her needs by leading them to what she wants, such as to the pantry when hungry.    2/23/2024  ETHOSUXIMIDE              40.0 - 100.0 ug/ml 56.5      AMMONIA  16 - 60 umol/L 42      VALPROIC ACID,TOTAL  50.0 - 100.0 ug/mL 51.6      VALPROIC ACID,FREE  6.0 - 20.0 ug/mL <2.8 Low        Wt Readings from Last 5 Encounters:   06/09/22 138 lb (62.6 kg)   05/27/21 137 lb 3.2 oz (62.2 kg)      Wide based gait, walks on toes with shuffling, holding onto staff for longer distances. Able to rise from a seated position on her own and ambulate unassisted. Does throw herself back at one point, with staff catching her, which they state is different from the shaking with falling backward. Dysconjugate gaze. Mouth open for most of visit with a smile. Nonverbal.      Assessment and Plan:   Ms. Linton has history of epilepsy in the setting of [facility]  syndrome. Her last clear seizure was in 2009. She would not tolerate imaging or EEG studies. She is having increasing shaking spells with whole body stiffening and flexing of the arms, urinary incontinence, and falls, unclear if these are the same as the previously described shaking spells which were not felt to represent seizure. She is having increasing behavioral issues, working with psychiatry.      With the added urinary incontinence associated with the shaking spells, and increasing behavioral events, we can consider a slight increase in her Depakote to 375-500 to see if this provides any benefit. Her Depakote level is in the low therapeutic range, with normal ammonia level and liver function. I would still like to have her seen by the movement team, which had been recommended for similar if not the same events in the past.      I have advised them to continue to work with psychiatry for her behavioral change

## 2024-10-16 NOTE — PROGRESS NOTES
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  Has had imaging done in the past as well as EEG       Review of diagnosis    Movement disorder    Avoidance of dopamine blockers   No antipsychotics but on depakote    Motor complication review   N/a    Review of Impulse control disorders   N/a    Review of surgical or medication options   none    Gait/Balance/Falls       Exercise/Therapy performed/offered       Cognitive/Driving       Mood       Hallucinations/delusions       Sleep       Bladder/Renal/Prostate/Gyn/Other       GI/Constipation/GERD     SBO  Constipation  Abdominal pain    ENDO/Lipid/DM/Bone density/Thyroid      Cardio/heart/Hyper or Hypotensive       Vision/Dry Eyes/Cataracts/Glaucoma/Macular       Heme/Anticoagulation/Antiplatelet/Anemia/Other    Leukocytosis      ENT/Resp    Peridonatal disease    Skin/Cancer/Seborrhea/other      Musculoskeletal/Pain/Headache      Other:    Left ovarian cyst             Medications                 Acetaminophen tylenol liquid             Bacitracin ointment              Bisacodyl dulcolax 10mg supp             Cranberry 250mg              Cranberry 500mg             Dextromethorphan guaifenesin              Divalproex delayed release depakote sprink  125mg             Ethosuximide zarontin 250mg/5ml soln             Fluconazole diflucan 150mg             Ibuprofen advil 100mg/5ml susp             Loperamide imodium 2mg             Loratadine claritin 10mg  prn           Mag hydroxide milk of magnesia              Nonformulary compressive stockings              NOrethindrone micronor 0.25mg             Nystatin mycostatin ointment              POlyethylene glycol miralax              Psyllium powder             Quetiapine seroquel 25mg prn                                                                                        Plan:              https://pubmed.ncbi.nlm.nih.gov/32838291/  https://pubmed.ncbi.nlm.nih.gov/02322781/    Coding statement:   Medical Decision Making:  #  Chronic progressive medical conditions addressed  - see above --   Review and/or interpretation of unique test or documentation from a provider outside of neurology    Independent historian provided additional details   I  Prescription drug management and review of potential side effects and/or monitoring for side effects  -- see above ---  Health impacted by social determinants of health      I have reviewed the note as documented above.  This accurately captures the substance of my conversation with the patient and total time spent preparing for visit, executing visit and completing visit on the day of the visit:   minutes.  The portion of this total time included face to face time     The longitudinal plan of care for Eli Linton was addressed during this visit. Due to the added complexity in care, I will continue to support Eli Linton in the subsequent management of this condition(s) and with the ongoing continuity of care of this condition(s).      Fahad Johnston MD     ______________________________________    Last visit date and details:     Patient:  Eli Linton  :  1995   Age:  29 year old   Today's Office Visit:  2024  Chief Complaint: Follow up seizures,  shaking spells, behavioral changes     Epilepsy Data:  According to the medical record, she had history of seizures since she was about 1 years old.  She has been on ethosuximide and Depakote for many years, and her seizures were well controlled.  Her last known seizure was over 10 years ago.  It was reported that she had the focal impaired seizures and the generalized seizures when she was young.     It was reported that her last suspected seizure was in 2009.  Her current seizure medications are:  Ethosuximide 550 mg b.i.d. and Depakote 375 mg in the morning and 500 mg in the evening.     For the past 6-8 months, she started to have increased falls recently and she also has bilateral arm shaking, sometimes can be fairly dramatic.  She can have arm shaking for like 8 hours a day.  She does not lose consciousness with these shaking, and sometimes she will fall.  It seems that she does not have bad injuries related to these falls.  When she has bad shaking, she may need assistance for walking.     Unfortunately, the patient cannot tolerate MRI or EEG so these were not done during the recent years.        History of Present Illness:   Ms. Linton was last seen by myself on 2/22/2024. At the time of the patient's last visit, she had history of epilepsy, well controlled, with her last seizure in 2009. She had Angelman syndrome with increasing falls, tremor and drooling.  She had prolonged history of arm shaking with related falls, of unclear etiology, though felt to be fairly clear this was not representing seizures. She was having recent behavioral concerns with possible urinary tract infection. She could not tolerate MRI or EEG evaluations. Labs were ordered for further evaluation. She required catheterization for urinalysis, which we were not able to complete at our lab. They were to review having a urinalysis and possible home safety evaluation with primary care. She was to continue current dosing of her  medications.     Today, Ms. Linton presents with two staff from [facility], new to the patient within the last six months. She has been shaking more and will fall backward. She has been caught when she falls back and has not hit the ground. They are not allowed to take videos of these events for further evaluation. They are initially adamant these are the same types of shaking and falls she was having previously, but are more intense. Upon further discussion regarding the shaking spells, it appears these events have possibly evolved or may be different than prior.      The shaking can last 30 seconds to 1 minute and 30 seconds. She flexes her arms with her hands in a fist and her whole body becomes tense. They state she will lose control of bladder during these events. They state she will be tired after the shaking spells, and can have some trouble getting up afterward. These can happen many times per hour. She does not lose consciousness.     They state she is having behavioral issues, and has seen psychiatry. She has been prescribed 25mg of Seroquel as needed, but has not needed to use this yet. She is eating her feces, menses, pulling hair, and biting. They state she is not having issues with using grab bars as discussed at her last visit.      She is sleeping through the night once she falls asleep. She may get up two the three times initially. She goes to bed between 9 and 10 PM and wakes at 6AM.      They deny any sign of infection. She does not have any increased stress. There has been no  change in medications.       She has not had any known seizures since last seen. She is not having side effects with her medications that they are aware of.     She has not been evaluated by the movement team for her shaking spells as previously discussed.     Staff indicates she communicates her needs by leading them to what she wants, such as to the pantry when hungry.     2/23/2024  ETHOSUXIMIDE               40.0 - 100.0 ug/ml 56.5      AMMONIA  16 - 60 umol/L 42      VALPROIC ACID,TOTAL  50.0 - 100.0 ug/mL 51.6      VALPROIC ACID,FREE  6.0 - 20.0 ug/mL <2.8 Low              Wt Readings from Last 5 Encounters:   06/09/22 138 lb (62.6 kg)   05/27/21 137 lb 3.2 oz (62.2 kg)      Wide based gait, walks on toes with shuffling, holding onto staff for longer distances. Able to rise from a seated position on her own and ambulate unassisted. Does throw herself back at one point, with staff catching her, which they state is different from the shaking with falling backward. Dysconjugate gaze. Mouth open for most of visit with a smile. Nonverbal.      Assessment and Plan:   Ms. Linton has history of epilepsy in the setting of Angelman syndrome. Her last clear seizure was in 2009. She would not tolerate imaging or EEG studies. She is having increasing shaking spells with whole body stiffening and flexing of the arms, urinary incontinence, and falls, unclear if these are the same as the previously described shaking spells which were not felt to represent seizure. She is having increasing behavioral issues, working with psychiatry.      With the added urinary incontinence associated with the shaking spells, and increasing behavioral events, we can consider a slight increase in her Depakote to 375-500 to see if this provides any benefit. Her Depakote level is in the low therapeutic range, with normal ammonia level and liver function. I would still like to have her seen by the movement team, which had been recommended for similar if not the same events in the past.      I have advised them to continue to work with psychiatry for her behavioral change          ______________________________________      Patient was asked about 14 Review of systems including changes in vision (dry eyes, double vision), hearing, heart, lungs, musculoskeletal, depression, anxiety, snoring, RBD, insomnia, urinary frequency, urinary urgency, constipation,  swallowing problems, hematological, ID, allergies, skin problems: seborrhea, endocrinological: thyroid, diabetes, cholesterol; balance, weight changes, and other neurological problems and these were not significant at this time except for   Patient Active Problem List   Diagnosis    Angelman syndrome    Constipation, chronic    Cyst of left ovary    Epilepsy (H)    Seizure disorder (H)    Generalized abdominal pain    Leukocytosis    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  No past medical history on file.  Social History     Socioeconomic History    Marital status: Single     Spouse name: Not on file    Number of children: Not on file    Years of education: Not on file    Highest education level: Not on file   Occupational History    Not on file   Tobacco Use    Smoking status: Never    Smokeless tobacco: Never   Substance and Sexual Activity    Alcohol use: Never    Drug use: Never    Sexual activity: Not on file   Other Topics Concern    Parent/sibling w/ CABG, MI or angioplasty before 65F 55M? Not Asked   Social History Narrative    Not on file     Social Determinants of Health     Financial Resource Strain: High Risk (1/1/2022)    Received from [facility], [facility]    Financial Resource Strain     Difficulty of Paying Living Expenses: Not on file     Difficulty of Paying Living Expenses: Not on file   Food Insecurity: Not on file   Transportation Needs: Not on file   Physical Activity: Not on file   Stress: Not on file   Social Connections: Unknown (1/1/2022)    [facility], [facility]    Social Connections     Frequency  of Communication with Friends and Family: Not on file   Interpersonal Safety: Not on file   Housing Stability: Not on file       Drug and lactation database from the [place] [employer] of Medicine:  http://toxnet.nlm.nih.gov/cgi-bin/sis/htmlgen?LACT      B/P: Data Unavailable, T: Data Unavailable, P: Data Unavailable, R: Data Unavailable 0 lbs 0 oz  There were no vitals taken for this visit., There is no height or weight on file to calculate BMI.  Medications and Vitals not listed above were documented in the cart and reviewed by me.     Current Outpatient Medications   Medication Sig Dispense Refill    acetaminophen (TYLENOL) 32 mg/mL liquid Take 160 mg by mouth every 6 hours as needed for fever or mild pain      bacitracin 500 UNIT/GM OINT SMARTSIG:Topical      bisacodyl (DULCOLAX) 10 MG suppository Place 10 mg rectally daily as needed.      Cranberry 250 MG CAPS TAKE 2 CAPSULES (500MG) BY MOUTH ONCE DAILY. BREAK APART AND PUT IN YOGURT AS PATIENT IS UNABLE TO SWALLOW PILLS   NON-COVERED MED      Cranberry 500 MG CAPS cranberry 500 mg capsule 1 break apart capsule daily to be put in yogurt as pt is unable to swallow pills      Dextromethorphan-guaiFENesin  MG/5ML syrup Take 10 mLs by mouth.      divalproex sodium delayed-release (DEPAKOTE SPRINKLE) 125 MG DR capsule TAKE 3 CAPSULES (375MG) BY MOUTH in the morning and 4 capsules (500mg) in the evening 210 capsule 10    ethosuximide (ZARONTIN) 250 MG/5ML solution TAKE 11ML (550MG) BY MOUTH TWICE DAILY. **STOPPERS, LUIZ BOTTLES ONLY** *ORDER WHEN LOW* 700 mL 9    fluconazole (DIFLUCAN) 150 MG tablet       ibuprofen (ADVIL/MOTRIN) 100 MG/5ML suspension Take 400 mg by mouth as needed      loperamide (IMODIUM) 2 MG capsule Take 2 mg by mouth every 3 hours as needed.      loratadine (CLARITIN) 10 MG tablet Take 10 mg by mouth daily as needed.      magnesium hydroxide (MILK OF MAGNESIA) 400 MG/5ML suspension Take 5 mLs by mouth as needed       NONFORMULARY For personal use. Length: calf Strength: 16-20 mmHg Circumference in cm. Fax to [phone]      norethindrone (MICRONOR) 0.35 MG tablet Take 0.35 mg by mouth daily.      nystatin (MYCOSTATIN) 397233 UNIT/GM external ointment SMARTSIG:Topical      polyethylene glycol (MIRALAX) 17 g packet Take 1 packet by mouth daily      Psyllium (REGULOID) 57.6 % POWD SMARTSIG:By Mouth      PSYLLIUM PO Take 1 tsp by mouth.      QUEtiapine (SEROQUEL) 25 MG tablet Prn through psychiatry           Fahad Johnston MD

## 2024-11-06 NOTE — TELEPHONE ENCOUNTER
RECORDS RECEIVED FROM: [facility] Everywhere   REASON FOR VISIT: Tremor/Angelman Syndrome   PROVIDER: Fahad Johnston MD   DATE OF APPT: 11/15/24 @ 2:45 pm    NOTES (FOR ALL VISITS) STATUS DETAILS   OFFICE NOTE from referring provider Internal 9/5/24, 2/22/24 Raven Urbano PA-C @[facility]     OFFICE NOTE from other specialist Care Everywhere 9/10/24, 7/2/24 Virginia Gutiérrez NP  @[facility]    1/30/24 Smiley Daniel NP  @[facility]    5/4/23 Jairo Lofton MD @[facility]     MEDICATION LIST Internal    IMAGING  (FOR ALL VISITS)     MRI (HEAD, NECK, SPINE) N/A    CT (HEAD, NECK, SPINE) N/A

## 2024-11-14 RX ORDER — ASPARTAME
POWDER (GRAM) MISCELLANEOUS
COMMUNITY
Start: 2024-08-01

## 2024-11-15 ENCOUNTER — PRE VISIT (OUTPATIENT)
Dept: NEUROLOGY | Facility: CLINIC | Age: 29
End: 2024-11-15

## 2024-11-15 ENCOUNTER — OFFICE VISIT (OUTPATIENT)
Dept: NEUROLOGY | Facility: CLINIC | Age: 29
End: 2024-11-15
Attending: PHYSICIAN ASSISTANT
Payer: COMMERCIAL

## 2024-11-15 DIAGNOSIS — R25.1 TREMOR: ICD-10-CM

## 2024-11-15 DIAGNOSIS — G40.909 RECURRENT SEIZURES (H): ICD-10-CM

## 2024-11-15 DIAGNOSIS — Q93.51 ANGELMAN SYNDROME: Primary | ICD-10-CM

## 2024-11-15 DIAGNOSIS — R25.1 EPISODE OF SHAKING: ICD-10-CM

## 2024-11-15 PROCEDURE — 99207 PR NO CHARGE LOS: CPT | Performed by: PSYCHIATRY & NEUROLOGY

## 2024-11-15 NOTE — Clinical Note
11/15/2024       RE: Eli Linton  [address] [facility] [address]     Dear Colleague,    Thank you for referring your patient, Eli Linton, to the [facility]. Please see a copy of my visit note below.    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         Review of diagnosis    ***    Avoidance of dopamine blockers   ***    Motor complication review   ***    Review of Impulse control disorders   ***    Review of surgical or medication options   ***    Gait/Balance/Falls   ***    Exercise/Therapy performed/offered   ***    Cognitive/Driving   ***    Mood   ***    Hallucinations/delusions   ***    Sleep   ***    Bladder/Renal/Prostate/Gyn/Other   ***    GI/Constipation/GERD   ***  SBO  Constipation  Abdominal pain    ENDO/Lipid/DM/Bone density/Thyroid  ***    Cardio/heart/Hyper or Hypotensive   ***    Vision/Dry Eyes/Cataracts/Glaucoma/Macular   ***    Heme/Anticoagulation/Antiplatelet/Anemia/Other  ***  Leukocytosis      ENT/Resp  ***  Peridonatal disease    Skin/Cancer/Seborrhea/other  ***    Musculoskeletal/Pain/Headache  ***    Other:  ***  Left ovarian cyst             Medications                  Acetaminophen tylenol liquid             Bacitracin ointment              Bisacodyl dulcolax 10mg supp             Cranberry 250mg              Cranberry 500mg             Dextromethorphan guaifenesin              Divalproex delayed release depakote sprink 125mg             Ethosuximide zarontin 250mg/5ml soln             Fluconazole diflucan 150mg             Ibuprofen advil 100mg/5ml susp             Loperamide imodium 2mg             Loratadine claritin 10mg  prn           Mag hydroxide milk of magnesia              Nonformulary compressive stockings              NOrethindrone micronor 0.25mg             Nystatin mycostatin ointment              POlyethylene glycol miralax              Psyllium powder             Quetiapine seroquel 25mg prn                                                                                        Plan:              https://pubmed.ncbi.nlm.nih.gov/59304799/  https://pubmed.ncbi.nlm.nih.gov/51843261/    Coding statement:   Medical Decision Making:  #  Chronic progressive medical conditions addressed  - see above --   Review and/or interpretation of unique test or documentation from a provider outside of neurology ***   Independent historian provided additional details  *** I  Prescription drug management and review of potential side effects and/or monitoring for side effects  -- see above ---  Health impacted by social determinants of health  ***    I have reviewed the note as documented above.  This accurately captures the substance of my conversation with the patient and total time spent preparing for visit, executing visit and completing visit on the day of the visit:  *** minutes.  The portion of this total time included face to face time ***    The longitudinal plan of care for Eli Linton was addressed during this visit. Due to the added complexity in care, I will continue to support Eli Linton in the subsequent management of this condition(s) and with the ongoing  continuity of care of this condition(s).      Fahad Johnston MD     ______________________________________    Last visit date and details:     Patient:  Eli Linton  :  1995   Age:  29 year old   Today's Office Visit:  2024  Chief Complaint: Follow up seizures, shaking spells, behavioral changes     Epilepsy Data:  According to the medical record, she had history of seizures since she was about 1 years old.  She has been on ethosuximide and Depakote for many years, and her seizures were well controlled.  Her last known seizure was over 10 years ago.  It was reported that she had the focal impaired seizures and the generalized seizures when she was young.     It was reported that her last suspected seizure was in .  Her current seizure medications are:  Ethosuximide 550 mg b.i.d. and Depakote 375 mg in the morning and 500 mg in the evening.     For the past 6-8 months, she started to have increased falls recently and she also has bilateral arm shaking, sometimes can be fairly dramatic.  She can have arm shaking for like 8 hours a day.  She does not lose consciousness with these shaking, and sometimes she will fall.  It seems that she does not have bad injuries related to these falls.  When she has bad shaking, she may need assistance for walking.     Unfortunately, the patient cannot tolerate MRI or EEG so these were not done during the recent years.        History of Present Illness:   Ms. Linton was last seen by myself on 2024. At the time of the patient's last visit, she had history of epilepsy, well controlled, with her last seizure in . She had Angelman syndrome with increasing falls, tremor and drooling.  She had prolonged history of arm shaking with related falls, of unclear etiology, though felt to be fairly clear this was not representing seizures. She was having recent behavioral concerns with possible urinary tract infection. She could not tolerate MRI or EEG evaluations. Labs  were ordered for further evaluation. She required catheterization for urinalysis, which we were not able to complete at our lab. They were to review having a urinalysis and possible home safety evaluation with primary care. She was to continue current dosing of her medications.     Today, Ms. Linton presents with two staff from [facility], new to the patient within the last six months. She has been shaking more and will fall backward. She has been caught when she falls back and has not hit the ground. They are not allowed to take videos of these events for further evaluation. They are initially adamant these are the same types of shaking and falls she was having previously, but are more intense. Upon further discussion regarding the shaking spells, it appears these events have possibly evolved or may be different than prior.      The shaking can last 30 seconds to 1 minute and 30 seconds. She flexes her arms with her hands in a fist and her whole body becomes tense. They state she will lose control of bladder during these events. They state she will be tired after the shaking spells, and can have some trouble getting up afterward. These can happen many times per hour. She does not lose consciousness.     They state she is having behavioral issues, and has seen psychiatry. She has been prescribed 25mg of Seroquel as needed, but has not needed to use this yet. She is eating her feces, menses, pulling hair, and biting. They state she is not having issues with using grab bars as discussed at her last visit.      She is sleeping through the night once she falls asleep. She may get up two the three times initially. She goes to bed between 9 and 10 PM and wakes at 6AM.      They deny any sign of infection. She does not have any increased stress. There has been no  change in medications.       She has not had any known seizures since last seen. She is not having side effects with her medications that they are  aware of.     She has not been evaluated by the movement team for her shaking spells as previously discussed.     Staff indicates she communicates her needs by leading them to what she wants, such as to the pantry when hungry.     2/23/2024  ETHOSUXIMIDE              40.0 - 100.0 ug/ml 56.5      AMMONIA  16 - 60 umol/L 42      VALPROIC ACID,TOTAL  50.0 - 100.0 ug/mL 51.6      VALPROIC ACID,FREE  6.0 - 20.0 ug/mL <2.8 Low              Wt Readings from Last 5 Encounters:   06/09/22 138 lb (62.6 kg)   05/27/21 137 lb 3.2 oz (62.2 kg)      Wide based gait, walks on toes with shuffling, holding onto staff for longer distances. Able to rise from a seated position on her own and ambulate unassisted. Does throw herself back at one point, with staff catching her, which they state is different from the shaking with falling backward. Dysconjugate gaze. Mouth open for most of visit with a smile. Nonverbal.      Assessment and Plan:   Ms. Linton has history of epilepsy in the setting of Angelman syndrome. Her last clear seizure was in 2009. She would not tolerate imaging or EEG studies. She is having increasing shaking spells with whole body stiffening and flexing of the arms, urinary incontinence, and falls, unclear if these are the same as the previously described shaking spells which were not felt to represent seizure. She is having increasing behavioral issues, working with psychiatry.      With the added urinary incontinence associated with the shaking spells, and increasing behavioral events, we can consider a slight increase in her Depakote to 375-500 to see if this provides any benefit. Her Depakote level is in the low therapeutic range, with normal ammonia level and liver function. I would still like to have her seen by the movement team, which had been recommended for similar if not the same events in the past.      I have advised them to continue to work with psychiatry for her behavioral  change          ______________________________________      Patient was asked about 14 Review of systems including changes in vision (dry eyes, double vision), hearing, heart, lungs, musculoskeletal, depression, anxiety, snoring, RBD, insomnia, urinary frequency, urinary urgency, constipation, swallowing problems, hematological, ID, allergies, skin problems: seborrhea, endocrinological: thyroid, diabetes, cholesterol; balance, weight changes, and other neurological problems and these were not significant at this time except for   Patient Active Problem List   Diagnosis    Angelman syndrome    Constipation, chronic    Cyst of left ovary    Epilepsy (H)    Seizure disorder (H)    Generalized abdominal pain    Leukocytosis    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  No past medical history on file.  Social History     Socioeconomic History    Marital status: Single     Spouse name: Not on file    Number of children: Not on file    Years of education: Not on file    Highest education level: Not on file   Occupational History    Not on file   Tobacco Use    Smoking status: Never    Smokeless tobacco: Never   Substance and Sexual Activity    Alcohol use: Never    Drug use: Never    Sexual activity: Not on file   Other Topics Concern    Parent/sibling w/ CABG, MI or angioplasty before 65F 55M? Not Asked   Social History Narrative    Not on file     Social Determinants of Health     Financial Resource Strain: High Risk (1/1/2022)    Received from [facility], [facility]    Financial Resource Strain     Difficulty of Paying Living Expenses: Not on file     Difficulty of Paying Living Expenses: Not on file   Food  Insecurity: Not on file   Transportation Needs: Not on file   Physical Activity: Not on file   Stress: Not on file   Social Connections: Unknown (1/1/2022)    Received from [facility], [facility]    Social Connections     Frequency of Communication with Friends and Family: Not on file   Interpersonal Safety: Not on file   Housing Stability: Not on file       Drug and lactation database from the [place] [employer]:  http://toxnet.nlm.nih.gov/cgi-bin/sis/htmlgen?LACT      B/P: Data Unavailable, T: Data Unavailable, P: Data Unavailable, R: Data Unavailable 0 lbs 0 oz  There were no vitals taken for this visit., There is no height or weight on file to calculate BMI.  Medications and Vitals not listed above were documented in the cart and reviewed by me.     Current Outpatient Medications   Medication Sig Dispense Refill    acetaminophen (TYLENOL) 32 mg/mL liquid Take 160 mg by mouth every 6 hours as needed for fever or mild pain      bacitracin 500 UNIT/GM OINT SMARTSIG:Topical      bisacodyl (DULCOLAX) 10 MG suppository Place 10 mg rectally daily as needed.      Cranberry 250 MG CAPS TAKE 2 CAPSULES (500MG) BY MOUTH ONCE DAILY. BREAK APART AND PUT IN YOGURT AS PATIENT IS UNABLE TO SWALLOW PILLS   NON-COVERED MED      Cranberry 500 MG CAPS cranberry 500 mg capsule 1 break apart capsule daily to be put in yogurt as pt is unable to swallow pills      Dextromethorphan-guaiFENesin  MG/5ML syrup Take 10 mLs by mouth.      divalproex sodium delayed-release (DEPAKOTE SPRINKLE) 125 MG DR capsule TAKE 3 CAPSULES (375MG) BY MOUTH in the morning and 4 capsules (500mg) in the evening 210 capsule 10    ethosuximide (ZARONTIN) 250 MG/5ML solution TAKE 11ML (550MG) BY MOUTH TWICE DAILY. **STOPPERS, LUIZ BOTTLES ONLY** *ORDER WHEN LOW* 700 mL 9    fluconazole (DIFLUCAN) 150 MG tablet       ibuprofen (ADVIL/MOTRIN) 100 MG/5ML suspension  Take 400 mg by mouth as needed      loperamide (IMODIUM) 2 MG capsule Take 2 mg by mouth every 3 hours as needed.      loratadine (CLARITIN) 10 MG tablet Take 10 mg by mouth daily as needed.      magnesium hydroxide (MILK OF MAGNESIA) 400 MG/5ML suspension Take 5 mLs by mouth as needed      NONFORMULARY For personal use. Length: calf Strength: 16-20 mmHg Circumference in cm. Fax to [phone]      norethindrone (MICRONOR) 0.35 MG tablet Take 0.35 mg by mouth daily.      nystatin (MYCOSTATIN) 346477 UNIT/GM external ointment SMARTSIG:Topical      polyethylene glycol (MIRALAX) 17 g packet Take 1 packet by mouth daily      Psyllium (REGULOID) 57.6 % POWD SMARTSIG:By Mouth      PSYLLIUM PO Take 1 tsp by mouth.      QUEtiapine (SEROQUEL) 25 MG tablet Prn through psychiatry           Fahad Johnston MD        Again, thank you for allowing me to participate in the care of your patient.      Sincerely,    Fahad Johnston MD

## 2025-01-21 NOTE — PROGRESS NOTES
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   She has not been able to tolerate a brain mri or eeg    Blinking really fast and hand waving back and forth but also shaking a bit.   Anxiety or nervousness may bring it on   Her legs may also shake and her knees and go backwards  She may have a staring spell afterwards  Leaning backwards and has mouth is open   No clear trigger  May last 10-15 seconds or 30-60 seconds.     Review of diagnosis    Movement disorder    Avoidance of dopamine blockers   On AEDs that are associated with tremor  Taking quetiapine    Motor complication review   N/a    Review of Impulse control disorders   She is impulsive    Review of surgical or medication options   - depakote  - ethosuccimide       Gait/Balance/Falls   Pacing  Had a fall recently - was a kitchen table and was trying to grab someone's else food and was shaking and went backwards when she was interfered by a staff member    May have had 1/2 the typical dose of depakote the day prior    Exercise/Therapy performed/offered   Walking - pacing.     Cognitive/Driving   She can understand yes or no but will bring something if she wants them.  Behaviors are her ways of communicating  She was mean when she had an ingrown toenail.   She does not  listen sometimes when people say yes or no     Mood    Variable     Hallucinations/delusions   Unknown     Sleep   Sleeps most nights and sometimes has problems till actually sleep without a few prompts.     Bladder/Renal/Prostate/Gyn/Other   She has briefs     GI/Constipation/GERD   Small bowel obstruction  Abdominal pain  Chronic constipation  Miralax/gavalax daily     ENDO/Lipid/DM/Bone density/Thyroid  No clear issues    Cardio/heart/Hyper or Hypotensive   - did not tolerate getting a bp today     Vision/Dry Eyes/Cataracts/Glaucoma/Macular   no    Heme/Anticoagulation/Antiplatelet/Anemia/Other  leukocytosis    ENT/Resp  Peridontal disease    Skin/Cancer/Seborrhea/other  - some skin changes.     Musculoskeletal/Pain/Headache  - had ingrown toenail.     Other:  Left ovarian cyst      Medications     7      7      Acetaminophen tylenol 500mg  prn           Aloe vera gel prn       Aspartame         Bacitracin ointment   prn           Bisacodyl dulcolax 10mg supp             Calamine lotion prn       Clotrimazole  prn       Dextromethorphan guaifenesin              Divalproex delay rel depakote sprinkle 125mg 3      4     Ethosuximide zarontin 250mg/5ml soln = 550mg x     x     Fluconazole diflucan 150mg             Ibuprofen advil 100mg/5ml susp             Loperamide imodium 2mg             Loratadine claritin 10mg  prn           Mag hydroxide milk of magnesia              Nonformulary compressive stockings              NOrethindrone micronor 0.25mg 1            Nystatin mycostatin ointment              POlyethylene glycol miralax        @7      Pramoxine-calamine  prn       Psyllium powder  tsp           Quetiapine seroquel 25mg off           Zinc oxide desitin 13%  prn                                                                         Plan:    She is not getting seroquel she had scratching of her throat and so she is not getting this.   Seen at [facility] and was given the prn medication   This was about 4 months ago.   Shlomo Travis M.D., MPH,  FACOEM [facility] [place]  [address], [place] [address], [facility]    Her spells are more frequent and lasting longer.   With the increased dose of depakote behaviors are better but not the episodes.   She is baseline aggressive and this is a bit better.     Franco Avalos     [place]   [address]  [facility] [address]  Phone     Guardian is her mother Hailey Linton.     Pharmacy ([facility]) consultation and medication management to review meds for behavior and seizure medication. Message various providers.   Please call the scheduling number @ [phone] to set up an appointment with pharmacists Nicole Ya, Bessy Billy, or Hannah Brown.     Future Appointments 2/20/2025 - 8/19/2025      None                    Coding statement:   Medical Decision Making:  #  Chronic progressive medical conditions addressed  - see above --   Review and/or interpretation of unique test or documentation from a provider outside of neurology yes   Independent historian provided additional details  yes I  Prescription drug management and review of potential side effects and/or monitoring for side effects  -- see above ---  Health impacted by social determinants of health  yes - communication issues and is in a group ever.     I have reviewed the note as documented above.  This accurately captures the substance of my conversation with the patient and total time spent preparing for visit, executing visit and completing visit on the day of the visit:  30 minutes.  The portion of this total time included face to face time     The longitudinal plan of care for Eli Linton was addressed during this visit. Due to the added complexity in care, I will continue to support Eli Linton in the subsequent management of this condition(s) and with the ongoing continuity of care of this condition(s).      Fahad Johnston MD      ______________________________________    Last visit date and details:     Patient:  Eli Linton  :  1995   Age:  29 year old   Today's Office Visit:  2024  Chief Complaint: Follow up seizures, shaking spells, behavioral changes     Epilepsy Data:  According to the medical record, she had history of seizures since she was about 1 years old.  She has been on ethosuximide and Depakote for many years, and her seizures were well controlled.  Her last known seizure was over 10 years ago.  It was reported that she had the focal impaired seizures and the generalized seizures when she was young.     It was reported that her last suspected seizure was in .  Her current seizure medications are:  Ethosuximide 550 mg b.i.d. and Depakote 375 mg in the morning and 500 mg in the evening.     For the past 6-8 months, she started to have increased falls recently and she also has bilateral arm shaking, sometimes can be fairly dramatic.  She can have arm shaking for like 8 hours a day.  She does not lose consciousness with these shaking, and sometimes she will fall.  It seems that she does not have bad injuries related to these falls.  When she has bad shaking, she may need assistance for walking.     Unfortunately, the patient cannot tolerate MRI or EEG so these were not done during the recent years.        History of Present Illness:   Ms. Linton was last seen by myself on 2024. At the time of the patient's last visit, she had history of epilepsy, well controlled, with her last seizure in . She had Angelman syndrome with increasing falls, tremor and drooling.  She had prolonged history of arm shaking with related falls, of unclear etiology, though felt to be fairly clear this was not representing seizures. She was having recent behavioral concerns with possible urinary tract infection. She could not tolerate MRI or EEG evaluations. Labs were ordered for further evaluation. She required  catheterization for urinalysis, which we were not able to complete at our lab. They were to review having a urinalysis and possible home safety evaluation with primary care. She was to continue current dosing of her medications.     Today, Ms. Linton presents with two staff from [facility], new to the patient within the last six months. She has been shaking more and will fall backward. She has been caught when she falls back and has not hit the ground. They are not allowed to take videos of these events for further evaluation. They are initially adamant these are the same types of shaking and falls she was having previously, but are more intense. Upon further discussion regarding the shaking spells, it appears these events have possibly evolved or may be different than prior.      The shaking can last 30 seconds to 1 minute and 30 seconds. She flexes her arms with her hands in a fist and her whole body becomes tense. They state she will lose control of bladder during these events. They state she will be tired after the shaking spells, and can have some trouble getting up afterward. These can happen many times per hour. She does not lose consciousness.     They state she is having behavioral issues, and has seen psychiatry. She has been prescribed 25mg of Seroquel as needed, but has not needed to use this yet. She is eating her feces, menses, pulling hair, and biting. They state she is not having issues with using grab bars as discussed at her last visit.      She is sleeping through the night once she falls asleep. She may get up two the three times initially. She goes to bed between 9 and 10 PM and wakes at 6AM.      They deny any sign of infection. She does not have any increased stress. There has been no  change in medications.       She has not had any known seizures since last seen. She is not having side effects with her medications that they are aware of.     She has not been evaluated by the  movement team for her shaking spells as previously discussed.     Staff indicates she communicates her needs by leading them to what she wants, such as to the pantry when hungry.     2/23/2024  ETHOSUXIMIDE              40.0 - 100.0 ug/ml 56.5      AMMONIA  16 - 60 umol/L 42      VALPROIC ACID,TOTAL  50.0 - 100.0 ug/mL 51.6      VALPROIC ACID,FREE  6.0 - 20.0 ug/mL <2.8 Low              Wt Readings from Last 5 Encounters:   06/09/22 138 lb (62.6 kg)   05/27/21 137 lb 3.2 oz (62.2 kg)      Wide based gait, walks on toes with shuffling, holding onto staff for longer distances. Able to rise from a seated position on her own and ambulate unassisted. Does throw herself back at one point, with staff catching her, which they state is different from the shaking with falling backward. Dysconjugate gaze. Mouth open for most of visit with a smile. Nonverbal.      Assessment and Plan:   Ms. Linton has history of epilepsy in the setting of Angelman syndrome. Her last clear seizure was in 2009. She would not tolerate imaging or EEG studies. She is having increasing shaking spells with whole body stiffening and flexing of the arms, urinary incontinence, and falls, unclear if these are the same as the previously described shaking spells which were not felt to represent seizure. She is having increasing behavioral issues, working with psychiatry.      With the added urinary incontinence associated with the shaking spells, and increasing behavioral events, we can consider a slight increase in her Depakote to 375-500 to see if this provides any benefit. Her Depakote level is in the low therapeutic range, with normal ammonia level and liver function. I would still like to have her seen by the movement team, which had been recommended for similar if not the same events in the past.      I have advised them to continue to work with psychiatry for her behavioral change      Lyn Fox    11/6/24 10:07 AM  Note          RECORDS  RECEIVED FROM: Care Everywhere   REASON FOR VISIT: Tremor/Angelman Syndrome   PROVIDER: Fahad Johnston MD   DATE OF APPT: 11/15/24 @ 2:45 pm    NOTES (FOR ALL VISITS) STATUS DETAILS   OFFICE NOTE from referring provider Internal 9/5/24, 2/22/24 Raven Urbano PA-C @[facility]      OFFICE NOTE from other specialist Care Everywhere 9/10/24, 7/2/24 Virginia Gutiérrez, NP  @[facility]     1/30/24 Smiley Daniel NP  @[facility]     5/4/23 Jairo Lofton MD @[facility]      MEDICATION LIST Internal     IMAGING  (FOR ALL VISITS)       MRI (HEAD, NECK, SPINE) N/A     CT (HEAD, NECK, SPINE) N/A              Additional Documentation        ______________________________________      Patient was asked about 14 Review of systems including changes in vision (dry eyes, double vision), hearing, heart, lungs, musculoskeletal, depression, anxiety, snoring, RBD, insomnia, urinary frequency, urinary urgency, constipation, swallowing problems, hematological, ID, allergies, skin problems: seborrhea, endocrinological: thyroid, diabetes, cholesterol; balance, weight changes, and other neurological problems and these were not significant at this time except for   Patient Active Problem List   Diagnosis    Angelman syndrome    Constipation, chronic    Cyst of left ovary    Epilepsy (H)    Seizure disorder (H)    Generalized abdominal pain    Leukocytosis    Nonintractable absence epilepsy without status epilepticus (H)    Periodontal disease    SBO (small bowel obstruction) (H)    Tremor          Allergies   Allergen Reactions    Amoxicillin Rash     Other reaction(s): Hives    Other Reaction(s): Not available     No past surgical history on file.  Past Medical History:   Diagnosis Date    Tremor 11/15/2024     Social History     Socioeconomic History    Marital status: Single     Spouse name: Not on file    Number of children: Not on file    Years of education: Not on file    Highest education level: Not on file    Occupational History    Not on file   Tobacco Use    Smoking status: Never    Smokeless tobacco: Never   Substance and Sexual Activity    Alcohol use: Never    Drug use: Never    Sexual activity: Not on file   Other Topics Concern    Parent/sibling w/ CABG, MI or angioplasty before 65F 55M? Not Asked   Social History Narrative    Not on file     Social Drivers of Health     Financial Resource Strain: High Risk (1/1/2022)    [facility], [facility]    Financial Resource Strain     Difficulty of Paying Living Expenses: Not on file     Difficulty of Paying Living Expenses: Not on file   Food Insecurity: Not on file   Transportation Needs: Not on file   Physical Activity: Not on file   Stress: Not on file   Social Connections: Unknown (1/1/2022)    Received from [facility], [facility]    Social Connections     Frequency of Communication with Friends and Family: Not on file   Interpersonal Safety: Not on file   Housing Stability: Not on file       Drug and lactation database from the [place] National Library of Medicine:  http://toxnet.nlm.nih.gov/cgi-bin/sis/htmlgen?LACT      B/P: Data Unavailable, T: Data Unavailable, P: Data Unavailable, R: Data Unavailable 0 lbs 0 oz  There were no vitals taken for this visit., There is no height or weight on file to calculate BMI.  Medications and Vitals not listed above were documented in the cart and reviewed by me.     Current Outpatient Medications   Medication Sig Dispense Refill    acetaminophen (TYLENOL) 32 mg/mL liquid Take 160 mg by mouth every 6 hours as needed for fever or mild pain      Aspartame, For Compounding, POWD       bacitracin 500 UNIT/GM OINT SMARTSIG:Topical      bisacodyl (DULCOLAX) 10 MG suppository Place 10 mg rectally daily as needed.      Cranberry 250 MG CAPS TAKE 2 CAPSULES (500MG) BY MOUTH ONCE  DAILY. BREAK APART AND PUT IN YOGURT AS PATIENT IS UNABLE TO SWALLOW PILLS   NON-COVERED MED      Cranberry 500 MG CAPS cranberry 500 mg capsule 1 break apart capsule daily to be put in yogurt as pt is unable to swallow pills      Dextromethorphan-guaiFENesin  MG/5ML syrup Take 10 mLs by mouth.      divalproex sodium delayed-release (DEPAKOTE SPRINKLE) 125 MG DR capsule TAKE 3 CAPSULES (375MG) BY MOUTH in the morning and 4 capsules (500mg) in the evening 210 capsule 10    ethosuximide (ZARONTIN) 250 MG/5ML solution TAKE 11ML (550MG) BY MOUTH TWICE DAILY. **STOPPERS, LUIZ BOTTLES ONLY** *ORDER WHEN LOW* 700 mL 9    fluconazole (DIFLUCAN) 150 MG tablet       ibuprofen (ADVIL/MOTRIN) 100 MG/5ML suspension Take 400 mg by mouth as needed      loperamide (IMODIUM) 2 MG capsule Take 2 mg by mouth every 3 hours as needed.      loratadine (CLARITIN) 10 MG tablet Take 10 mg by mouth daily as needed.      magnesium hydroxide (MILK OF MAGNESIA) 400 MG/5ML suspension Take 5 mLs by mouth as needed      NONFORMULARY For personal use. Length: calf Strength: 16-20 mmHg Circumference in cm. Fax to [phone]      norethindrone (MICRONOR) 0.35 MG tablet Take 0.35 mg by mouth daily.      nystatin (MYCOSTATIN) 151725 UNIT/GM external ointment SMARTSIG:Topical      polyethylene glycol (MIRALAX) 17 g packet Take 1 packet by mouth daily      Psyllium (REGULOID) 57.6 % POWD SMARTSIG:By Mouth      PSYLLIUM PO Take 1 tsp by mouth.      QUEtiapine (SEROQUEL) 25 MG tablet Prn through psychiatry           Fahad Johnston MD

## 2025-02-19 PROBLEM — K59.00 CONSTIPATION: Status: ACTIVE | Noted: 2024-11-21

## 2025-02-19 PROBLEM — J06.9 ACUTE URI: Status: ACTIVE | Noted: 2025-01-30

## 2025-02-20 ENCOUNTER — OFFICE VISIT (OUTPATIENT)
Dept: NEUROLOGY | Facility: CLINIC | Age: 30
End: 2025-02-20
Payer: COMMERCIAL

## 2025-02-20 DIAGNOSIS — R25.1 EPISODE OF SHAKING: ICD-10-CM

## 2025-02-20 DIAGNOSIS — G40.909 RECURRENT SEIZURES (H): ICD-10-CM

## 2025-02-20 DIAGNOSIS — R25.1 TREMOR: Primary | ICD-10-CM

## 2025-02-20 DIAGNOSIS — R29.6 RECURRENT FALLS: ICD-10-CM

## 2025-02-20 DIAGNOSIS — Q93.51 ANGELMAN SYNDROME: ICD-10-CM
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
## 2025-02-20 RX ORDER — ALOE VERA
GEL (GRAM) TOPICAL
COMMUNITY

## 2025-02-20 RX ORDER — OMEGA-3 FATTY ACIDS/FISH OIL 300-1000MG
200 CAPSULE ORAL EVERY 4 HOURS PRN
COMMUNITY

## 2025-02-20 RX ORDER — HYDROCORTISONE 10 MG/G
CREAM TOPICAL DAILY PRN
COMMUNITY

## 2025-02-20 RX ORDER — ACETAMINOPHEN 500 MG
1000 TABLET ORAL EVERY 6 HOURS PRN
COMMUNITY

## 2025-02-20 RX ORDER — CALAMINE
LOTION (ML) TOPICAL PRN
COMMUNITY

## 2025-02-20 RX ORDER — CLOTRIMAZOLE 1 %
CREAM (GRAM) TOPICAL PRN
COMMUNITY

## 2025-02-20 RX ORDER — POLYETHYLENE GLYCOL 3350 17 G/17G
1 POWDER, FOR SOLUTION ORAL DAILY PRN
COMMUNITY

## 2025-02-20 RX ORDER — PETROLATUM,WHITE/LANOLIN
OINTMENT (GRAM) TOPICAL 4 TIMES DAILY PRN
COMMUNITY

## 2025-02-20 RX ORDER — PHENYLEPHRINE HCL 10 MG/1
TABLET, FILM COATED ORAL PRN
COMMUNITY

## 2025-02-20 RX ORDER — MAGNESIUM CARB/ALUMINUM HYDROX 105-160MG
TABLET,CHEWABLE ORAL DAILY PRN
COMMUNITY

## 2025-02-20 RX ORDER — HYDROCORTISONE 10 MG/G
CREAM TOPICAL 2 TIMES DAILY PRN
COMMUNITY

## 2025-02-20 NOTE — PATIENT INSTRUCTIONS
Medications     7      7      Acetaminophen tylenol 500mg  prn           Aloe vera gel prn       Aspartame         Bacitracin ointment   prn           Bisacodyl dulcolax 10mg supp             Calamine lotion prn       Clotrimazole  prn       Dextromethorphan guaifenesin              Divalproex delay rel depakote sprinkle 125mg 3      4     Ethosuximide zarontin 250mg/5ml soln = 550mg x     x     Fluconazole diflucan 150mg             Ibuprofen advil 100mg/5ml susp             Loperamide imodium 2mg             Loratadine claritin 10mg  prn           Mag hydroxide milk of magnesia              Nonformulary compressive stockings              NOrethindrone micronor 0.25mg 1            Nystatin mycostatin ointment              POlyethylene glycol miralax        @7      Pramoxine-calamine  prn       Psyllium powder  tsp           Quetiapine seroquel 25mg off           Zinc oxide desitin 13%  prn                                                                         Plan:    She is not getting seroquel she had scratching of her throat and so she is not getting this.   Seen at [facility] and was given the prn medication   This was about 4 months ago.   Shlomo Travis M.D., MPH, FACOEM [facility] [place] [address], [place] [address], [place]    Her spells are more frequent and lasting longer.   With the increased dose of depakote behaviors are better but not the episodes.   She is baseline aggressive and this is a bit better.     Franco Avalos     [place]   [address] [address]  Phone     Guardian is her mother Hailey Linton.     Pharmacy ([facility]) consultation and medication management to review meds for behavior and seizure medication. Message various providers.   Please call the scheduling number @ [phone] to set up an appointment with pharmacists Nicole Ya, Bessy Billy, or Hannah Brown.

## 2025-02-20 NOTE — NURSING NOTE
Chief Complaint   Patient presents with    Consult For     Both hands and whole body tremor     Farrah Arias

## 2025-02-20 NOTE — LETTER
2025       RE: Eli Linton  [address] [place] [place] [address]     Dear Colleague,    Thank you for referring your patient, Eli Linton, to the [facility] [place] at . Please see a copy of my visit note below.        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   She has not been able to tolerate a brain mri or eeg    Blinking really fast and hand waving back and forth but also shaking a bit.   Anxiety or nervousness may bring it on   Her legs may also shake and her knees and go backwards  She may have a staring spell afterwards  Leaning backwards and has mouth is open   No clear trigger  May last 10-15 seconds or 30-60 seconds.     Review of diagnosis    Movement disorder    Avoidance of dopamine blockers   On AEDs that are associated with tremor  Taking quetiapine    Motor complication review   N/a    Review of Impulse control disorders   She is impulsive    Review of surgical or medication options   - depakote  - ethosuccimide       Gait/Balance/Falls   Pacing  Had a fall recently - was a kitchen table and was trying to grab someone's else food and was shaking and went backwards when she was interfered by a staff member    May have had 1/2 the typical dose of depakote the day  prior    Exercise/Therapy performed/offered   Walking - pacing.     Cognitive/Driving   She can understand yes or no but will bring something if she wants them.  Behaviors are her ways of communicating  She was mean when she had an ingrown toenail.   She does not  listen sometimes when people say yes or no     Mood   Variable     Hallucinations/delusions   Unknown     Sleep   Sleeps most nights and sometimes has problems till actually sleep without a few prompts.     Bladder/Renal/Prostate/Gyn/Other   She has briefs     GI/Constipation/GERD   Small bowel obstruction  Abdominal pain  Chronic constipation  Miralax/gavalax daily     ENDO/Lipid/DM/Bone density/Thyroid  No clear issues    Cardio/heart/Hyper or Hypotensive   - did not tolerate getting a bp today     Vision/Dry Eyes/Cataracts/Glaucoma/Macular   no    Heme/Anticoagulation/Antiplatelet/Anemia/Other  leukocytosis    ENT/Resp  Peridontal disease    Skin/Cancer/Seborrhea/other  - some skin changes.     Musculoskeletal/Pain/Headache  - had ingrown toenail.     Other:  Left ovarian cyst      Medications     7      7      Acetaminophen tylenol 500mg  prn           Aloe vera gel prn       Aspartame         Bacitracin ointment   prn           Bisacodyl dulcolax 10mg supp             Calamine lotion prn       Clotrimazole  prn       Dextromethorphan guaifenesin              Divalproex delay rel depakote sprinkle 125mg 3      4     Ethosuximide zarontin 250mg/5ml soln = 550mg x     x     Fluconazole diflucan 150mg             Ibuprofen advil 100mg/5ml susp             Loperamide imodium 2mg             Loratadine claritin 10mg  prn           Mag hydroxide milk of magnesia              Nonformulary compressive stockings              NOrethindrone micronor 0.25mg 1            Nystatin mycostatin ointment              POlyethylene glycol miralax        @7      Pramoxine-calamine  prn       Psyllium powder  tsp           Quetiapine seroquel 25mg off           Zinc oxide  desitin 13%  prn                                                                         Plan:    She is not getting seroquel she had scratching of her throat and so she is not getting this.   Seen at [facility] and was given the prn medication   This was about 4 months ago.   Shlomo Travis M.D., MPH, FACOEM [facility]  [address], [place] [address]    Her spells are more frequent and lasting longer.   With the increased dose of depakote behaviors are better but not the episodes.   She is baseline aggressive and this is a bit better.     Franco Avalos     [place]   [address]  [facility] [address]  Phone     Guardian is her mother Hailey Linton.     Pharmacy ([facility]) consultation and medication management to review meds for behavior and seizure medication. Message various providers.   Please call the scheduling number @ [phone] to set up an appointment with pharmacists Nicole Ya, Bessy Billy, or Hannah Brown.     Future Appointments 2/20/2025 - 8/19/2025      None                    Coding statement:   Medical Decision Making:  #  Chronic progressive medical conditions addressed  - see above --   Review and/or interpretation of unique test or documentation from a provider outside of neurology yes   Independent historian provided additional details  yes I  Prescription drug management and review of potential side effects and/or monitoring for side effects  -- see above ---  Health impacted by social determinants of health  yes - communication issues and is in a group ever.     I have reviewed the note as documented above.  This accurately captures the substance of my conversation with the patient and total time spent preparing for visit, executing visit and completing visit on the day of the visit:  30 minutes.  The portion of this total time included face to face time     The longitudinal plan of care  for Eli Linton was addressed during this visit. Due to the added complexity in care, I will continue to support Eli Linton in the subsequent management of this condition(s) and with the ongoing continuity of care of this condition(s).      Fahad Johnston MD     ______________________________________    Last visit date and details:     Patient:  Eli Linton  :  1995   Age:  29 year old   Today's Office Visit:  2024  Chief Complaint: Follow up seizures, shaking spells, behavioral changes     Epilepsy Data:  According to the medical record, she had history of seizures since she was about 1 years old.  She has been on ethosuximide and Depakote for many years, and her seizures were well controlled.  Her last known seizure was over 10 years ago.  It was reported that she had the focal impaired seizures and the generalized seizures when she was young.     It was reported that her last suspected seizure was in .  Her current seizure medications are:  Ethosuximide 550 mg b.i.d. and Depakote 375 mg in the morning and 500 mg in the evening.     For the past 6-8 months, she started to have increased falls recently and she also has bilateral arm shaking, sometimes can be fairly dramatic.  She can have arm shaking for like 8 hours a day.  She does not lose consciousness with these shaking, and sometimes she will fall.  It seems that she does not have bad injuries related to these falls.  When she has bad shaking, she may need assistance for walking.     Unfortunately, the patient cannot tolerate MRI or EEG so these were not done during the recent years.        History of Present Illness:   Ms. Linton was last seen by myself on 2024. At the time of the patient's last visit, she had history of epilepsy, well controlled, with her last seizure in . She had Angelman syndrome with increasing falls, tremor and drooling.  She had prolonged history of arm shaking with related falls, of unclear etiology,  though felt to be fairly clear this was not representing seizures. She was having recent behavioral concerns with possible urinary tract infection. She could not tolerate MRI or EEG evaluations. Labs were ordered for further evaluation. She required catheterization for urinalysis, which we were not able to complete at our lab. They were to review having a urinalysis and possible home safety evaluation with primary care. She was to continue current dosing of her medications.     Today, Ms. Linton presents with two staff from [facility], new to the patient within the last six months. She has been shaking more and will fall backward. She has been caught when she falls back and has not hit the ground. They are not allowed to take videos of these events for further evaluation. They are initially adamant these are the same types of shaking and falls she was having previously, but are more intense. Upon further discussion regarding the shaking spells, it appears these events have possibly evolved or may be different than prior.      The shaking can last 30 seconds to 1 minute and 30 seconds. She flexes her arms with her hands in a fist and her whole body becomes tense. They state she will lose control of bladder during these events. They state she will be tired after the shaking spells, and can have some trouble getting up afterward. These can happen many times per hour. She does not lose consciousness.     They state she is having behavioral issues, and has seen psychiatry. She has been prescribed 25mg of Seroquel as needed, but has not needed to use this yet. She is eating her feces, menses, pulling hair, and biting. They state she is not having issues with using grab bars as discussed at her last visit.      She is sleeping through the night once she falls asleep. She may get up two the three times initially. She goes to bed between 9 and 10 PM and wakes at 6AM.      They deny any sign of infection. She does not have  any increased stress. There has been no  change in medications.       She has not had any known seizures since last seen. She is not having side effects with her medications that they are aware of.     She has not been evaluated by the movement team for her shaking spells as previously discussed.     Staff indicates she communicates her needs by leading them to what she wants, such as to the pantry when hungry.     2/23/2024  ETHOSUXIMIDE              40.0 - 100.0 ug/ml 56.5      AMMONIA  16 - 60 umol/L 42      VALPROIC ACID,TOTAL  50.0 - 100.0 ug/mL 51.6      VALPROIC ACID,FREE  6.0 - 20.0 ug/mL <2.8 Low              Wt Readings from Last 5 Encounters:   06/09/22 138 lb (62.6 kg)   05/27/21 137 lb 3.2 oz (62.2 kg)      Wide based gait, walks on toes with shuffling, holding onto staff for longer distances. Able to rise from a seated position on her own and ambulate unassisted. Does throw herself back at one point, with staff catching her, which they state is different from the shaking with falling backward. Dysconjugate gaze. Mouth open for most of visit with a smile. Nonverbal.      Assessment and Plan:   Ms. Linton has history of epilepsy in the setting of Angelman syndrome. Her last clear seizure was in 2009. She would not tolerate imaging or EEG studies. She is having increasing shaking spells with whole body stiffening and flexing of the arms, urinary incontinence, and falls, unclear if these are the same as the previously described shaking spells which were not felt to represent seizure. She is having increasing behavioral issues, working with psychiatry.      With the added urinary incontinence associated with the shaking spells, and increasing behavioral events, we can consider a slight increase in her Depakote to 375-500 to see if this provides any benefit. Her Depakote level is in the low therapeutic range, with normal ammonia level and liver function. I would still like to have her seen by  the movement team, which had been recommended for similar if not the same events in the past.      I have advised them to continue to work with psychiatry for her behavioral change      Lyn Fox   MARIS    11/6/24 10:07 AM  Note          RECORDS RECEIVED FROM: Care Everywhere   REASON FOR VISIT: Tremor/Angelman Syndrome   PROVIDER: Fahad Johnston MD   DATE OF APPT: 11/15/24 @ 2:45 pm    NOTES (FOR ALL VISITS) STATUS DETAILS   OFFICE NOTE from referring provider Internal 9/5/24, 2/22/24 Raven Urbano PA-C @[facility]      OFFICE NOTE from other specialist Care Everywhere 9/10/24, 7/2/24 Virginia Gutiérrez NP  [email or web address]     1/30/24 Smiley Daniel NP  [email or web address]     5/4/23 Jairo Lofton MD @[facility]      MEDICATION LIST Internal     IMAGING  (FOR ALL VISITS)       MRI (HEAD, NECK, SPINE) N/A     CT (HEAD, NECK, SPINE) N/A              Additional Documentation        ______________________________________      Patient was asked about 14 Review of systems including changes in vision (dry eyes, double vision), hearing, heart, lungs, musculoskeletal, depression, anxiety, snoring, RBD, insomnia, urinary frequency, urinary urgency, constipation, swallowing problems, hematological, ID, allergies, skin problems: seborrhea, endocrinological: thyroid, diabetes, cholesterol; balance, weight changes, and other neurological problems and these were not significant at this time except for   Patient Active Problem List   Diagnosis     Angelman syndrome     Constipation, chronic     Cyst of left ovary     Epilepsy (H)     Seizure disorder (H)     Generalized abdominal pain     Leukocytosis     Nonintractable absence epilepsy without status epilepticus (H)     Periodontal disease     SBO (small bowel obstruction) (H)     Tremor          Allergies   Allergen Reactions     Amoxicillin Rash     Other reaction(s): Hives    Other Reaction(s): Not available     No past surgical history on file.  Past  Medical History:   Diagnosis Date     Tremor 11/15/2024     Social History     Socioeconomic History     Marital status: Single     Spouse name: Not on file     Number of children: Not on file     Years of education: Not on file     Highest education level: Not on file   Occupational History     Not on file   Tobacco Use     Smoking status: Never     Smokeless tobacco: Never   Substance and Sexual Activity     Alcohol use: Never     Drug use: Never     Sexual activity: Not on file   Other Topics Concern     Parent/sibling w/ CABG, MI or angioplasty before 65F 55M? Not Asked   Social History Narrative     Not on file     Social Drivers of Health     Financial Resource Strain: High Risk (1/1/2022)    [facility] [employer] [employer]    Financial Resource Strain      Difficulty of Paying Living Expenses: Not on file      Difficulty of Paying Living Expenses: Not on file   Food Insecurity: Not on file   Transportation Needs: Not on file   Physical Activity: Not on file   Stress: Not on file   Social Connections: Unknown (1/1/2022)    [facility] [employer] [employer]    Social Connections      Frequency of Communication with Friends and Family: Not on file   Interpersonal Safety: Not on file   Housing Stability: Not on file       Drug and lactation database from the [place] National Library of Medicine:  http://toxnet.nlm.nih.gov/cgi-bin/sis/htmlgen?LACT      B/P: Data Unavailable, T: Data Unavailable, P: Data Unavailable, R: Data Unavailable 0 lbs 0 oz  There were no vitals taken for this visit., There is no height or weight on file to calculate BMI.  Medications and Vitals not listed above were documented in the cart and reviewed by me.     Current Outpatient Medications   Medication Sig Dispense Refill     acetaminophen (TYLENOL) 32 mg/mL liquid Take 160 mg by mouth  every 6 hours as needed for fever or mild pain       Aspartame, For Compounding, POWD        bacitracin 500 UNIT/GM OINT SMARTSIG:Topical       bisacodyl (DULCOLAX) 10 MG suppository Place 10 mg rectally daily as needed.       Cranberry 250 MG CAPS TAKE 2 CAPSULES (500MG) BY MOUTH ONCE DAILY. BREAK APART AND PUT IN YOGURT AS PATIENT IS UNABLE TO SWALLOW PILLS   NON-COVERED MED       Cranberry 500 MG CAPS cranberry 500 mg capsule 1 break apart capsule daily to be put in yogurt as pt is unable to swallow pills       Dextromethorphan-guaiFENesin  MG/5ML syrup Take 10 mLs by mouth.       divalproex sodium delayed-release (DEPAKOTE SPRINKLE) 125 MG DR capsule TAKE 3 CAPSULES (375MG) BY MOUTH in the morning and 4 capsules (500mg) in the evening 210 capsule 10     ethosuximide (ZARONTIN) 250 MG/5ML solution TAKE 11ML (550MG) BY MOUTH TWICE DAILY. **STOPPERS, LUIZ BOTTLES ONLY** *ORDER WHEN LOW* 700 mL 9     fluconazole (DIFLUCAN) 150 MG tablet        ibuprofen (ADVIL/MOTRIN) 100 MG/5ML suspension Take 400 mg by mouth as needed       loperamide (IMODIUM) 2 MG capsule Take 2 mg by mouth every 3 hours as needed.       loratadine (CLARITIN) 10 MG tablet Take 10 mg by mouth daily as needed.       magnesium hydroxide (MILK OF MAGNESIA) 400 MG/5ML suspension Take 5 mLs by mouth as needed       NONFORMULARY For personal use. Length: calf Strength: 16-20 mmHg Circumference in cm. Fax to [phone]       norethindrone (MICRONOR) 0.35 MG tablet Take 0.35 mg by mouth daily.       nystatin (MYCOSTATIN) 730025 UNIT/GM external ointment SMARTSIG:Topical       polyethylene glycol (MIRALAX) 17 g packet Take 1 packet by mouth daily       Psyllium (REGULOID) 57.6 % POWD SMARTSIG:By Mouth       PSYLLIUM PO Take 1 tsp by mouth.       QUEtiapine (SEROQUEL) 25 MG tablet Prn through psychiatry           Fahad Johnston MD      Again, thank you for allowing me to participate in the care of your patient.      Sincerely,    Fahad Johnston,  MD

## 2025-05-02 DIAGNOSIS — G40.909 RECURRENT SEIZURES (H): ICD-10-CM

## 2025-05-04 RX ORDER — ETHOSUXIMIDE 250 MG/5ML
SOLUTION ORAL
Qty: 700 ML | Refills: 7 | Status: SHIPPED | OUTPATIENT
Start: 2025-05-04

## 2025-05-05 NOTE — TELEPHONE ENCOUNTER
Last Written Prescription:  5/30/24   700 ml : 9  ----------------------  Last Visit Date: 2/20/25  Future Visit Date: none  ----------------------  Refill decision: Medication refilled per  Medication Refill in Ambulatory Care  policy.    Request from pharmacy:  Requested Prescriptions   Pending Prescriptions Disp Refills    ethosuximide (ZARONTIN) 250 MG/5ML solution [Pharmacy Med Name: Ethosuximide 250 MG/5ML Solution] 700 mL 11     Sig: TAKE 11ML (550MG) BY MOUTH TWICE DAILY. **STOPPERS, LUIZ BOTTLES ONLY** *ORDER WHEN LOW*       There is no refill protocol information for this order

## 2025-05-28 ENCOUNTER — VIRTUAL VISIT (OUTPATIENT)
Dept: NEUROLOGY | Facility: CLINIC | Age: 30
End: 2025-05-28
Payer: COMMERCIAL

## 2025-05-28 DIAGNOSIS — G40.909 RECURRENT SEIZURES (H): ICD-10-CM

## 2025-05-28 RX ORDER — DIVALPROEX SODIUM 125 MG/1
CAPSULE, COATED PELLETS ORAL
Qty: 210 CAPSULE | Refills: 11 | Status: SHIPPED | OUTPATIENT
Start: 2025-05-28

## 2025-05-28 RX ORDER — ETHOSUXIMIDE 250 MG/5ML
SOLUTION ORAL
Qty: 700 ML | Refills: 11 | Status: SHIPPED | OUTPATIENT
Start: 2025-05-28

## 2025-05-28 NOTE — PROGRESS NOTES
CC: Follow up for seizures.    HPI: Video call for follow up.  This patient is a 27-year-old female with history of epilepsy and Angelman syndrome.  She was referred last year because she had increased falls recently and had arm shaking or difficult to control.  Her group home staff Gianfrancoleda was also in the video call today.    Since the last visit about 2 years ago, she had no seizures. She continue to have intermittent arms shaking, about once a week.  Each shaking will last for 1-2 min.  These are not considered as seizures. Her Depakote was decreased to 375 mg bid.  Her last seizure was over 10 years ago. She is taking Depakote 375 - 500 and Zarontin 550 mg bid. Her behavior has been really good.    According to the medical record, she had history of seizures since she was about 1 years old.  She has been on ethosuximide and Depakote for many years, and her seizures were well controlled.  Her last known seizure was over 10 years ago.  It was reported that she had the focal impaired seizures and the generalized seizures when she was young.    It was reported that her last suspected seizure was in 2009.  Her current seizure medications are:  Ethosuximide 550 mg b.i.d. and Depakote 375 mg in the morning and 500 mg in the evening.    For the past 6-8 months, she started to have increased falls recently and she also has bilateral arm shaking, sometimes can be fairly dramatic.  She can have arm shaking for like 8 hours a day.  She does not lose consciousness with these shaking, and sometimes she will fall.  It seems that she does not have bad injuries related to these falls.  When she has bad shaking, she may need assistance for walking.    Unfortunately, the patient cannot tolerate MRI or EEG so these were not done during the recent years.    TRIGGERS FOR SEIZURES:  Unclear.    RISK FACTORS FOR SEIZURES:  She has a history of Angelman syndrome.  No history of head trauma with loss of consciousness.  No history of CNS  infection.  She did have history of febrile convulsions when she was young.    Current Outpatient Medications   Medication Sig Dispense Refill    acetaminophen (TYLENOL) 500 MG tablet Take 1,000 mg by mouth every 6 hours as needed for mild pain.      aloe vera GEL Apply topically every hour as needed for skin care.      bacitracin 500 UNIT/GM OINT SMARTSIG:Topical      Calamine external lotion Apply topically as needed for itching.      carbamide peroxide (DEBROX) 6.5 % otic solution 5 drops daily as needed for other.      clotrimazole (LOTRIMIN) 1 % external cream Apply topically as needed.      dextromethorphan (DELSYM) 30 MG/5ML liquid Take 60 mg by mouth 2 times daily as needed for cough.      divalproex sodium delayed-release (DEPAKOTE SPRINKLE) 125 MG DR capsule TAKE 3 CAPSULES (375MG) BY MOUTH in the morning and 4 capsules (500mg) in the evening 210 capsule 10    ethosuximide (ZARONTIN) 250 MG/5ML solution TAKE 11ML (550MG) BY MOUTH TWICE DAILY. **STOPPERS, LUIZ BOTTLES ONLY** *ORDER WHEN LOW* 700 mL 7    hydrocortisone 1 % CREA cream Place rectally daily as needed for itching.      ibuprofen (ADVIL/MOTRIN) 200 MG capsule Take 400 mg by mouth every 4 hours as needed for fever.      loperamide (IMODIUM) 2 MG capsule Take 2 mg by mouth every 3 hours as needed.      loratadine (CLARITIN) 10 MG tablet Take 10 mg by mouth daily as needed.      magnesium hydroxide (MILK OF MAGNESIA) 400 MG/5ML suspension Take 5 mLs by mouth as needed      mineral oil liquid Take by mouth daily as needed.      Neomycin-Bacitracin-Polymyxin (TRIPLE ANTIBIOTIC EX) Externally apply topically as needed.      NONFORMULARY For personal use. Length: calf Strength: 16-20 mmHg Circumference in cm. Fax to [phone]      norethindrone (MICRONOR) 0.35 MG tablet Take 0.35 mg by mouth daily.      phenylephrine HCl (SUDAFED PE) 10 MG TABS Take by mouth as needed.      polyethylene glycol (MIRALAX) 17 g packet Take 1 packet by mouth daily as  needed for constipation.      polyethylene glycol (MIRALAX) 17 g packet Take 1 packet by mouth daily      Pramoxine-Calamine (CALADRYL EX) Externally apply topically daily as needed. Lotion 8-1%      PSYLLIUM PO Take 1 tsp by mouth daily. Reguloid powder      vitamin A & D (BABY) external ointment Apply topically 4 times daily as needed for dry skin or irritation.      witch hazel-glycerin (TUCKS) pad Apply topically as needed for hemorrhoids.      Zinc Oxide (DESITIN CREAMY EX) Externally apply topically daily as needed.         ALLERGIES:  Amoxicillin.    PAST MEDICAL HISTORY:  Epilepsy, tremor, unsteady gait, Angelman syndrome.    PAST SURGICAL HISTORY:  None.    FAMILY HISTORY:  No family history of seizures.    SOCIAL HISTORY:  She lives in a group home.  She had some Special Education.  She is not working.  Never , no children.  No smoking, no alcohol, no drug abuse.    REVIEW OF SYSTEMS:  Positive for constipation and urinary incontinence.    PHYSICAL EXAMINATION:      Alert, non-verbal.  Not following commands.    PREVIOUS DIAGNOSTIC TESTING:  No relevant testing was performed recently.    IMPRESSION:     1.  History of epilepsy, well controlled.  Last seizure was in 2009.  2.  History of Angelman syndrome, stable.  3.  Recent spells of arm shaking and the related falls.  The etiology for these spells are not entirely clear at this time; however, it is fairly clear that these are not epilepsy or seizures.  Differential diagnosis would include movement disorder or simply behavioral. Another possibility for these arm shaking is asterixis related to increased ammonia; however, her recent ammonia were all relatively normal.    Since the last visit about 2 years ago, she had no seizures. She continue to have intermittent arms shaking, about once a week.  Each shaking will last for 1-2 min.  These are not considered as seizures. Her Depakote was decreased to 375 mg bid.  Her last seizure was over 10 years  ago. She is taking Depakote 375 - 500 and Zarontin 550 mg bid. Her behavior has been really good.    Her last seizure was in 2009.    PLAN:    1.  The patient is not able to tolerate MRI or EEG; seizures are well controlled.  2.  Continue Depakote to 375 - 500, Zarontin 550 mg bid.  3.  Return to clinic in 12 month to follow up with [facility]. Group home staff was advised to take some videos of her shaking to help the diagnosis.      The longitudinal plan of care for seizures was addressed during this visit. Due to the added complexity in care, I will continue to support this patient in the subsequent management of this condition and with the ongoing continuity of care of this condition.       30 min total time was spent on the day of this visit.      20 min was spent on face to face time  10 min was spent on preparation of visit to review charts and labs, ordering medications and tests, and documentation of clinical information

## 2025-05-28 NOTE — NURSING NOTE
Is the patient currently in the state of [place]? YES - Is there an order to remove in 48 hours: No, if no order for removal discuss with MD to remove.    Visit mode:VIDEO    If the visit is dropped, the patient can be reconnected by: TELEPHONE VISIT: Phone number: [phone]    Will anyone else be joining the visit? YES: How would they like to receive their invitation? Other e-mail: KATHY Turner      How would you like to obtain your AVS? MyChart    Are changes needed to the allergy or medication list? NO    Reason for visit: Follow Up      Virtual Visit Details    Type of service:  Video Visit     Originating Location (pt. Location): Other prison    Distant Location (provider location):  On-site  Platform used for Video Visit: Jimmy

## 2025-05-28 NOTE — LETTER
2025       RE: Eli Linton  : 1995   MRN: [id number]      Dear Colleague,    Thank you for referring your patient, Eli Linton, to the [facility] at [facility]. Please see a copy of my visit note below.    CC: Follow up for seizures.    HPI: Video call for follow up.  This patient is a 27-year-old female with history of epilepsy and Angelman syndrome.  She was referred last year because she had increased falls recently and had arm shaking or difficult to control.  Her group home staff Regina was also in the video call today.    Since the last visit about 2 years ago, she had no seizures. She continue to have intermittent arms shaking, about once a week.  Each shaking will last for 1-2 min.  These are not considered as seizures. Her Depakote was decreased to 375 mg bid.  Her last seizure was over 10 years ago. She is taking Depakote 375 - 500 and Zarontin 550 mg bid. Her behavior has been really good.    According to the medical record, she had history of seizures since she was about 1 years old.  She has been on ethosuximide and Depakote for many years, and her seizures were well controlled.  Her last known seizure was over 10 years ago.  It was reported that she had the focal impaired seizures and the generalized seizures when she was young.    It was reported that her last suspected seizure was in .  Her current seizure medications are:  Ethosuximide 550 mg b.i.d. and Depakote 375 mg in the morning and 500 mg in the evening.    For the past 6-8 months, she started to have increased falls recently and she also has bilateral arm shaking, sometimes can be fairly dramatic.  She can have arm shaking for like 8 hours a day.  She does not lose consciousness with these shaking, and sometimes she will fall.  It seems that she does not have bad injuries related to these falls.  When she has bad shaking, she may need assistance for  walking.    Unfortunately, the patient cannot tolerate MRI or EEG so these were not done during the recent years.    TRIGGERS FOR SEIZURES:  Unclear.    RISK FACTORS FOR SEIZURES:  She has a history of Angelman syndrome.  No history of head trauma with loss of consciousness.  No history of CNS infection.  She did have history of febrile convulsions when she was young.    Current Outpatient Medications   Medication Sig Dispense Refill     acetaminophen (TYLENOL) 500 MG tablet Take 1,000 mg by mouth every 6 hours as needed for mild pain.       aloe vera GEL Apply topically every hour as needed for skin care.       bacitracin 500 UNIT/GM OINT SMARTSIG:Topical       Calamine external lotion Apply topically as needed for itching.       carbamide peroxide (DEBROX) 6.5 % otic solution 5 drops daily as needed for other.       clotrimazole (LOTRIMIN) 1 % external cream Apply topically as needed.       dextromethorphan (DELSYM) 30 MG/5ML liquid Take 60 mg by mouth 2 times daily as needed for cough.       divalproex sodium delayed-release (DEPAKOTE SPRINKLE) 125 MG DR capsule TAKE 3 CAPSULES (375MG) BY MOUTH in the morning and 4 capsules (500mg) in the evening 210 capsule 10     ethosuximide (ZARONTIN) 250 MG/5ML solution TAKE 11ML (550MG) BY MOUTH TWICE DAILY. **STOPPERS, LUIZ BOTTLES ONLY** *ORDER WHEN LOW* 700 mL 7     hydrocortisone 1 % CREA cream Place rectally daily as needed for itching.       ibuprofen (ADVIL/MOTRIN) 200 MG capsule Take 400 mg by mouth every 4 hours as needed for fever.       loperamide (IMODIUM) 2 MG capsule Take 2 mg by mouth every 3 hours as needed.       loratadine (CLARITIN) 10 MG tablet Take 10 mg by mouth daily as needed.       magnesium hydroxide (MILK OF MAGNESIA) 400 MG/5ML suspension Take 5 mLs by mouth as needed       mineral oil liquid Take by mouth daily as needed.       Neomycin-Bacitracin-Polymyxin (TRIPLE ANTIBIOTIC EX) Externally apply topically as needed.       NONFORMULARY For  personal use. Length: calf Strength: 16-20 mmHg Circumference in cm. Fax to [phone]       norethindrone (MICRONOR) 0.35 MG tablet Take 0.35 mg by mouth daily.       phenylephrine HCl (SUDAFED PE) 10 MG TABS Take by mouth as needed.       polyethylene glycol (MIRALAX) 17 g packet Take 1 packet by mouth daily as needed for constipation.       polyethylene glycol (MIRALAX) 17 g packet Take 1 packet by mouth daily       Pramoxine-Calamine (CALADRYL EX) Externally apply topically daily as needed. Lotion 8-1%       PSYLLIUM PO Take 1 tsp by mouth daily. Reguloid powder       vitamin A & D (BABY) external ointment Apply topically 4 times daily as needed for dry skin or irritation.       witch hazel-glycerin (TUCKS) pad Apply topically as needed for hemorrhoids.       Zinc Oxide (DESITIN CREAMY EX) Externally apply topically daily as needed.         ALLERGIES:  Amoxicillin.    PAST MEDICAL HISTORY:  Epilepsy, tremor, unsteady gait, Angelman syndrome.    PAST SURGICAL HISTORY:  None.    FAMILY HISTORY:  No family history of seizures.    SOCIAL HISTORY:  She lives in a group home.  She had some Special Education.  She is not working.  Never , no children.  No smoking, no alcohol, no drug abuse.    REVIEW OF SYSTEMS:  Positive for constipation and urinary incontinence.    PHYSICAL EXAMINATION:      Alert, non-verbal.  Not following commands.    PREVIOUS DIAGNOSTIC TESTING:  No relevant testing was performed recently.    IMPRESSION:     1.  History of epilepsy, well controlled.  Last seizure was in 2009.  2.  History of Angelman syndrome, stable.  3.  Recent spells of arm shaking and the related falls.  The etiology for these spells are not entirely clear at this time; however, it is fairly clear that these are not epilepsy or seizures.  Differential diagnosis would include movement disorder or simply behavioral. Another possibility for these arm shaking is asterixis related to increased ammonia; however, her recent  ammonia were all relatively normal.    Since the last visit about 2 years ago, she had no seizures. She continue to have intermittent arms shaking, about once a week.  Each shaking will last for 1-2 min.  These are not considered as seizures. Her Depakote was decreased to 375 mg bid.  Her last seizure was over 10 years ago. She is taking Depakote 375 - 500 and Zarontin 550 mg bid. Her behavior has been really good.    Her last seizure was in 2009.    PLAN:    1.  The patient is not able to tolerate MRI or EEG; seizures are well controlled.  2.  Continue Depakote to 375 - 500, Zarontin 550 mg bid.  3.  Return to clinic in 12 month to follow up with [facility]. Group home staff was advised to take some videos of her shaking to help the diagnosis.      The longitudinal plan of care for seizures was addressed during this visit. Due to the added complexity in care, I will continue to support this patient in the subsequent management of this condition and with the ongoing continuity of care of this condition.       30 min total time was spent on the day of this visit.      20 min was spent on face to face time  10 min was spent on preparation of visit to review charts and labs, ordering medications and tests, and documentation of clinical information    Again, thank you for allowing me to participate in the care of your patient.      Sincerely,    Jairo Lofton MD

## 2025-05-28 NOTE — PATIENT INSTRUCTIONS
PLAN:    1.  The patient is not able to tolerate MRI or EEG; seizures are well controlled.  2.  Continue Depakote to 375 - 500, Zarontin 550 mg bid.  3.  Return to clinic in 12 month to follow up with [facility].

## 2025-07-09 ENCOUNTER — HOSPITAL ENCOUNTER (EMERGENCY)
Facility: CLINIC | Age: 30
Discharge: HOME | End: 2025-07-09
Attending: EMERGENCY MEDICINE | Admitting: EMERGENCY MEDICINE
Payer: COMMERCIAL

## 2025-07-09 VITALS — DIASTOLIC BLOOD PRESSURE: 82 MMHG | HEART RATE: 86 BPM | SYSTOLIC BLOOD PRESSURE: 118 MMHG

## 2025-07-09 DIAGNOSIS — N30.01 ACUTE CYSTITIS WITH HEMATURIA: ICD-10-CM

## 2025-07-09 LAB
ALBUMIN UR-MCNC: 20 MG/DL
APPEARANCE UR: ABNORMAL
BILIRUB UR QL STRIP: NEGATIVE
COLOR UR AUTO: YELLOW
GLUCOSE UR STRIP-MCNC: NEGATIVE MG/DL
HGB UR QL STRIP: ABNORMAL
KETONES UR STRIP-MCNC: 10 MG/DL
LEUKOCYTE ESTERASE UR QL STRIP: ABNORMAL
MUCOUS THREADS #/AREA URNS LPF: PRESENT /LPF
NITRATE UR QL: NEGATIVE
PH UR STRIP: 6.5 [PH] (ref 5–7)
RBC URINE: >182 /HPF
SP GR UR STRIP: 1.03 (ref 1–1.03)
SQUAMOUS EPITHELIAL: 30 /HPF
UROBILINOGEN UR STRIP-MCNC: NORMAL MG/DL
WBC URINE: 49 /HPF

## 2025-07-09 PROCEDURE — 99284 EMERGENCY DEPT VISIT MOD MDM: CPT | Performed by: EMERGENCY MEDICINE

## 2025-07-09 PROCEDURE — 250N000011 HC RX IP 250 OP 636: Performed by: EMERGENCY MEDICINE

## 2025-07-09 PROCEDURE — 87086 URINE CULTURE/COLONY COUNT: CPT | Performed by: EMERGENCY MEDICINE

## 2025-07-09 PROCEDURE — 99283 EMERGENCY DEPT VISIT LOW MDM: CPT | Performed by: EMERGENCY MEDICINE

## 2025-07-09 PROCEDURE — 81003 URINALYSIS AUTO W/O SCOPE: CPT | Performed by: EMERGENCY MEDICINE

## 2025-07-09 PROCEDURE — 81001 URINALYSIS AUTO W/SCOPE: CPT | Performed by: EMERGENCY MEDICINE

## 2025-07-09 PROCEDURE — 250N000013 HC RX MED GY IP 250 OP 250 PS 637: Performed by: EMERGENCY MEDICINE

## 2025-07-09 RX ORDER — CEPHALEXIN 500 MG/1
500 CAPSULE ORAL 3 TIMES DAILY
Qty: 15 CAPSULE | Refills: 0 | Status: SHIPPED | OUTPATIENT
Start: 2025-07-09 | End: 2025-07-14

## 2025-07-09 RX ORDER — CEPHALEXIN 500 MG/1
500 CAPSULE ORAL ONCE
Status: COMPLETED | OUTPATIENT
Start: 2025-07-09 | End: 2025-07-09

## 2025-07-09 RX ADMIN — CEPHALEXIN 500 MG: 500 CAPSULE ORAL at 18:41

## 2025-07-09 RX ADMIN — MIDAZOLAM 5 MG: 1 INJECTION INTRAMUSCULAR; INTRAVENOUS at 17:14

## 2025-07-09 ASSESSMENT — ACTIVITIES OF DAILY LIVING (ADL)
ADLS_ACUITY_SCORE: 41

## 2025-07-09 ASSESSMENT — COLUMBIA-SUICIDE SEVERITY RATING SCALE - C-SSRS: IS THE PATIENT NOT ABLE TO COMPLETE C-SSRS: UNABLE TO VERBALIZE

## 2025-07-09 NOTE — ED TRIAGE NOTES
Pt has hx UTI's, known behaviors of UTI is lifting leg and urinate on staff and eating own poop which she has been doing for the past couple of days. Staff denies rash/smell/vaginal discharge in vaginal area when cleaning     Triage Assessment (Adult)       Row Name 07/09/25 1534          Triage Assessment    Airway WDL WDL        Respiratory WDL    Respiratory WDL WDL        Skin Circulation/Temperature WDL    Skin Circulation/Temperature WDL WDL        Cardiac WDL    Cardiac WDL WDL        Peripheral/Neurovascular WDL    Peripheral Neurovascular WDL WDL        Cognitive/Neuro/Behavioral WDL    Cognitive/Neuro/Behavioral WDL WDL

## 2025-07-09 NOTE — ED PROVIDER NOTES
HPI  Chief Complaint   Patient presents with    Rule out Urinary Tract Infection     Pt has hx UTI's, known behaviors of UTI is lifting leg and urinate on staff and eating own poop which she has been doing for the past couple of days.      HPI  Eli Linton is a 30 year old female with history of Angelman syndrome who presents from group home with concerns for UTI.  No other changes other than behaviors.  No fevers.  No change in appetite.  No nausea or vomiting.  Has has required straight cath urine previously.  Has required sedation for medical procedures in the past.  [facility] w/ general anesthesia for dental and pelvic exam earlier this year. Symptoms consistent with prior urinary tract infections. Unable to get UA through clinic.     History obtained from staff members. Patient is non verbal.     Allergies:  Allergies   Allergen Reactions    Seroquel [Quetiapine]      Scratchy throat issue    Amoxicillin Rash     Other reaction(s): Hives    Other Reaction(s): Not available       Problem List:    Patient Active Problem List    Diagnosis Date Noted    Fall, subsequent encounter 02/19/2025     Priority: Medium    Acute URI 01/30/2025     Priority: Medium    Constipation 11/21/2024     Priority: Medium    Tremor 11/15/2024     Priority: Medium    Periodontal disease 08/15/2024     Priority: Medium    Seizure disorder (H) 10/24/2023     Priority: Medium     Formatting of this note might be different from the original.   Managed by Neurologist  Formatting of this note might be different from the original.   Formatting of this note might be different from the original.    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    Family History:    No family history on file.    Social History:  Marital Status:  Single [1]  Social History     Tobacco Use    Smoking status: Never    Smokeless tobacco: Never   Substance Use Topics    Alcohol use: Never    Drug use: Never        Medications:    cephALEXin (KEFLEX) 500 MG capsule  acetaminophen (TYLENOL) 500 MG tablet  aloe vera GEL  bacitracin 500 UNIT/GM OINT  Calamine external lotion  carbamide peroxide (DEBROX) 6.5 % otic solution  clotrimazole (LOTRIMIN) 1 % external cream  dextromethorphan (DELSYM) 30 MG/5ML liquid  divalproex sodium delayed-release (DEPAKOTE SPRINKLE) 125 MG DR capsule  ethosuximide (ZARONTIN) 250 MG/5ML solution  hydrocortisone 1 % CREA cream  ibuprofen (ADVIL/MOTRIN) 200 MG capsule  loperamide (IMODIUM) 2 MG capsule  loratadine (CLARITIN) 10 MG tablet  magnesium hydroxide (MILK OF MAGNESIA) 400 MG/5ML suspension  mineral oil liquid  Neomycin-Bacitracin-Polymyxin (TRIPLE ANTIBIOTIC EX)  NONFORMULARY  norethindrone (MICRONOR) 0.35 MG tablet  phenylephrine HCl (SUDAFED PE) 10 MG TABS  polyethylene glycol (MIRALAX) 17 g packet  polyethylene glycol (MIRALAX) 17 g packet  Pramoxine-Calamine (CALADRYL EX)  PSYLLIUM PO  vitamin A & D (BABY) external ointment  witch hazel-glycerin (TUCKS) pad  Zinc Oxide (DESITIN CREAMY EX)          Review of Systems  Pertinent positives and negatives mentioned in HPI    Physical Exam        GEN: awake and alert. Does not appear distressed.   CV : Extremities warm and well perfused.  PULM: Normal effort. Speaking in full sentences.  NEURO: purposeful movements of all extremities. .   ABD: soft, non  tender, non distended.   BACK: no CVA tenderness    ED Course        Procedures                 Critical Care time:  none              Recent Results (from the past 24 hours)   UA with Microscopic reflex to Culture    Specimen: Urine, Mars Catheter   Result Value Ref Range    Color Urine Yellow Colorless, Straw, Light Yellow, Yellow    Appearance Urine Slightly Cloudy (A) Clear    Glucose Urine Negative Negative mg/dL    Bilirubin Urine Negative Negative    Ketones Urine 10 (A) Negative mg/dL    Specific Gravity Urine 1.028 1.003 - 1.035    Blood Urine Large (A) Negative    pH Urine 6.5 5.0 - 7.0    Protein Albumin Urine 20 (A) Negative mg/dL    Urobilinogen Urine Normal Normal mg/dL    Nitrite Urine Negative Negative    Leukocyte Esterase Urine Moderate (A) Negative    Mucus Urine Present (A) None Seen /LPF    RBC Urine >182 (H) <=2 /HPF    WBC Urine 49 (H) <=5 /HPF    Squamous Epithelials Urine 30 (H) <=1 /HPF    Narrative    Urine Culture ordered based on laboratory criteria       Medications   midazolam (VERSED) injection 5 mg (5 mg Nasal $Given 7/9/25 2734)     Followed by   midazolam (VERSED) injection 1 mg (has no administration in time range)   cephALEXin (KEFLEX) capsule 500 mg (has no administration in time range)       Assessments & Plan (with Medical Decision Making)   30 year old nonverbal at baseline female with Angelman syndrome presents from group home with toileting behaviors consistent with prior urinary tract infections detailed in HPI.  No fever.  No tachycardia or hypotension.  No vomiting.  No change in appetite.  No change in alertness reported.  She does allow me to examine her and reassuring abdominal exam.  No CVA tenderness.  Nontoxic appearance.  Would like to get straight cath urine specimen.  Staff reports that in the past she has needed to be restrained for this to happen.  Is also had sedation for this.  Discussed options with staff.  I think an anxiolytic would be appropriate to  help facilitate straight cath of urine.  5 mg of intranasal Versed given.  Urine obtained by nursing staff and reported gross hematuria.  Unfortunately had numerous squamous cells.  Did not appear infected but difficult to interpret in this setting.  Discussed options for empiric treatment versus repeating cath specimen.  They were able to pull up records from outside hospital and shows that she had urine culture with Proteus which was sensitive to cephalexin.  She does have an allergy to penicillin but tolerated this earlier this year.  I think empiric treatment is reasonable.  If symptoms worsen would have to reconsider at that time.  She was resting comfortably but drowsy.  No respiratory depression, hypoxia or tachycardia.  No hypotension.  Will give dose of cephalexin here in ED tonight and Rx sent to their preferred pharmacy.  Follow-up and ED return precautions discussed with staff.    Tolerated anxiolysis well.          I have reviewed the nursing notes.         New Prescriptions    CEPHALEXIN (KEFLEX) 500 MG CAPSULE    Take 1 capsule (500 mg) by mouth 3 times daily for 5 days.       Final diagnoses:   Acute cystitis with hematuria     Stas Yuen MD        7/9/2025   [facility] EMERGENCY DEPT    Disclaimer: This note consists of words and symbols derived from keyboarding and dictation using voice recognition software.  As a result, there may be errors that have gone undetected.  Please consider this when interpreting information found in this note.               Stas Yuen MD  07/09/25 1705

## 2025-07-09 NOTE — DISCHARGE INSTRUCTIONS
Take cephalexin as prescribed.     Follow up in clinic as needed.     Return to ED for further evaluation if she develops fever greater than 100.4, vomiting, decreased alertness, or other new symptoms that you find concerning.

## 2025-07-10 ENCOUNTER — MYC MEDICAL ADVICE (OUTPATIENT)
Dept: NEUROLOGY | Facility: CLINIC | Age: 30
End: 2025-07-10

## 2025-07-10 DIAGNOSIS — G40.909 RECURRENT SEIZURES (H): ICD-10-CM

## 2025-07-11 LAB — BACTERIA UR CULT: NORMAL

## 2025-07-13 RX ORDER — DIVALPROEX SODIUM 125 MG/1
CAPSULE, COATED PELLETS ORAL
Qty: 210 CAPSULE | Refills: 11 | Status: SHIPPED | OUTPATIENT
Start: 2025-07-13

## 2025-07-13 NOTE — TELEPHONE ENCOUNTER
"New prescription sent to [facility] with instruction: \"Capsules can be broken open, can be put in liquid form or mix with food\".  "